# Patient Record
Sex: MALE | Race: WHITE | NOT HISPANIC OR LATINO | Employment: OTHER | URBAN - METROPOLITAN AREA
[De-identification: names, ages, dates, MRNs, and addresses within clinical notes are randomized per-mention and may not be internally consistent; named-entity substitution may affect disease eponyms.]

---

## 2017-01-02 ENCOUNTER — OFFICE VISIT (OUTPATIENT)
Dept: OPHTHALMOLOGY | Facility: CLINIC | Age: 77
End: 2017-01-02
Payer: COMMERCIAL

## 2017-01-02 DIAGNOSIS — H43.813 POSTERIOR VITREOUS DETACHMENT, BILATERAL: ICD-10-CM

## 2017-01-02 DIAGNOSIS — H26.9 CATARACT: ICD-10-CM

## 2017-01-02 DIAGNOSIS — E11.9 TYPE 2 DIABETES MELLITUS WITHOUT COMPLICATION, WITHOUT LONG-TERM CURRENT USE OF INSULIN (H): ICD-10-CM

## 2017-01-02 DIAGNOSIS — H52.4 PRESBYOPIA: ICD-10-CM

## 2017-01-02 DIAGNOSIS — H35.30 MACULAR DEGENERATION: ICD-10-CM

## 2017-01-02 DIAGNOSIS — Z01.01 ENCOUNTER FOR EXAMINATION OF EYES AND VISION WITH ABNORMAL FINDINGS: Primary | ICD-10-CM

## 2017-01-02 DIAGNOSIS — Z96.1 PSEUDOPHAKIA: ICD-10-CM

## 2017-01-02 PROCEDURE — 92015 DETERMINE REFRACTIVE STATE: CPT | Performed by: OPHTHALMOLOGY

## 2017-01-02 PROCEDURE — 92014 COMPRE OPH EXAM EST PT 1/>: CPT | Performed by: OPHTHALMOLOGY

## 2017-01-02 ASSESSMENT — VISUAL ACUITY
OD_CC: 20/50
OS_PH_CC: 25+2
OS_CC: 20/30
OD_CC: 10
CORRECTION_TYPE: GLASSES
OS_CC: 8
OS_CC+: -2
METHOD: SNELLEN - LINEAR
OD_PH_CC: 30-1

## 2017-01-02 ASSESSMENT — REFRACTION_WEARINGRX
SPECS_TYPE: TRIFOCAL
OS_ADD: +3.00
OS_SPHERE: -1.50
OD_SPHERE: -1.75
OS_AXIS: 176
OD_CYLINDER: +3.00
OD_AXIS: 015
OD_ADD: +3.00
OS_CYLINDER: +0.75

## 2017-01-02 ASSESSMENT — REFRACTION_MANIFEST
OD_AXIS: 017
OD_CYLINDER: +3.75
OS_CYLINDER: +0.50
OD_SPHERE: -2.00
OS_ADD: +3.00
OS_AXIS: 168
OD_ADD: +3.00
OS_SPHERE: -1.00

## 2017-01-02 ASSESSMENT — TONOMETRY
OS_IOP_MMHG: 13
IOP_METHOD: APPLANATION
OD_IOP_MMHG: 20

## 2017-01-02 ASSESSMENT — EXTERNAL EXAM - LEFT EYE: OS_EXAM: PROLAPSED FAT PADS: UPPER, LOWER

## 2017-01-02 ASSESSMENT — CUP TO DISC RATIO
OD_RATIO: 0.3
OS_RATIO: 0.3

## 2017-01-02 ASSESSMENT — CONF VISUAL FIELD
OS_NORMAL: 1
OD_NORMAL: 1

## 2017-01-02 ASSESSMENT — EXTERNAL EXAM - RIGHT EYE: OD_EXAM: PROLAPSED FAT PADS: UPPER, LOWER

## 2017-01-02 ASSESSMENT — SLIT LAMP EXAM - LIDS
COMMENTS: 2+ DERMATOCHALASIS - UPPER LID
COMMENTS: 2+ DERMATOCHALASIS - UPPER LID

## 2017-01-02 NOTE — MR AVS SNAPSHOT
"              After Visit Summary   1/2/2017    Elijah Pavon    MRN: [id number]           Patient Information     Date Of Birth          1940        Visit Information        Provider Department      1/2/2017 10:00 AM Kodak Hurley MD [facility]        Today's Diagnoses     Encounter for examination of eyes and vision with abnormal findings    -  1     Presbyopia         Myopia, bilateral         Astigmatism, bilateral         Type 2 diabetes mellitus without complication, without long-term current use of insulin (H)         Cataract, mild-mod, od         Macular degeneration (senile) of retina         Pseudophakia, os         Posterior vitreous detachment, bilateral           Care Instructions    Possible clouding of posterior capsule discussed. Left eye   Take a multiple vitamin or \"eye vitamin\" daily.  Protect your eyes outdoors from ultraviolet rays with sunglasses and/or brimmed hat.  Have spinach (cooked or raw), colorful fruits, walnuts, hazelnuts, almonds in your diet.  Monitor the vision in each eye weekly - call if any sudden persistent changes.   Offered cataract surgery right eye at anytime. Call Sandy VALDEZ @ [phone] to schedule.   Continue same glasses.   Otherwise Call in September 2017 for an appointment in January 2018 for Complete Exam    Dr. Hurley [phone]        Follow-ups after your visit        Who to contact     If you have questions or need follow up information about today's clinic visit or your schedule please contact [facility] directly at [phone].  Normal or non-critical lab and imaging results will be communicated to you by MyChart, letter or phone within 4 business days after the clinic has received the results. If you do not hear from us within 7 days, please contact the clinic through MyChart or phone. If you have a critical or abnormal lab result, we will notify you by phone as soon as possible.  Submit refill requests through " "[employer] or call your pharmacy and they will forward the refill request to us. Please allow 3 business days for your refill to be completed.          Additional Information About Your Visit        MyChart Information     [employer] lets you send messages to your doctor, view your test results, renew your prescriptions, schedule appointments and more. To sign up, go to [email or web address] . Click on \"Log in\" on the left side of the screen, which will take you to the Welcome page. Then click on \"Sign up Now\" on the right side of the page.     You will be asked to enter the access code listed below, as well as some personal information. Please follow the directions to create your username and password.     Your access code is: [email or web address]  Expires: 2017 10:47 AM     Your access code will  in 90 days. If you need help or a new code, please call your [place] clinic or [phone].         Blood Pressure from Last 3 Encounters:   No data found for BP    Weight from Last 3 Encounters:   No data found for Wt              We Performed the Following     EYE EXAM (SIMPLE-NONBILLABLE)     REFRACTIVE STATUS        Primary Care Provider Office Phone # Fax #    [place] [phone]       [facility] [address] [place] [address]        Thank you!     Thank you for choosing [facility]] [place]  for your care. Our goal is always to provide you with excellent care. Hearing back from our patients is one way we can continue to improve our services. Please take a few minutes to complete the written survey that you may receive in the mail after your visit with us. Thank you!             Your Updated Medication List - Protect others around you: Learn how to safely use, store and throw away your medicines at www.disposemymeds.org.          This list is accurate as of: 17 10:47 AM.  Always use your most recent med list.                   Brand Name Dispense Instructions " for use    ASPIR-81 PO      Take  by mouth.       chlorthalidone 25 MG tablet    HYGROTON     Take 25 mg by mouth daily.       DIOVAN 320 MG tablet   Generic drug:  valsartan      Take 1 tablet by mouth daily.       metFORMIN 500 MG tablet    GLUCOPHAGE     Take 500 mg by mouth 2 times daily (with meals).       MULTI-VITAMIN DAILY PO      Take 1 tablet by mouth once       simvastatin 20 MG tablet    ZOCOR     Take  by mouth At Bedtime.

## 2017-01-02 NOTE — PROGRESS NOTES
" Current Eye Medications:  none     Subjective:  Comprehensive Eye Exam.  Patient is here for a Diabetic Eye Exam.  Last A1C was:  6.5 about 4 months ago.  He is having difficulty seeing small print (crosswords).  Distance vision appears to be about the same.      Objective:  See Ophthalmology Exam.       Assessment: Cataract now visually significant right eye.  No diabetic retinopathy.      ICD-10-CM    1. Encounter for examination of eyes and vision with abnormal findings Z01.01 REFRACTIVE STATUS   2. Presbyopia H52.4 REFRACTIVE STATUS   3. Type 2 diabetes mellitus without complication, without long-term current use of insulin (H) E11.9 EYE EXAM (SIMPLE-NONBILLABLE)   4. Cataract, mod, od H26.9    5. Pseudophakia, os Z96.1    6. Macular degeneration, dry, mild, ou H35.30    7. Posterior vitreous detachment, bilateral H43.813         Plan: Possible clouding of posterior capsule discussed. Left eye   Take a multiple vitamin or \"eye vitamin\" daily.  Protect your eyes outdoors from ultraviolet rays with sunglasses and/or brimmed hat.  Have spinach (cooked or raw), colorful fruits, walnuts, hazelnuts, almonds in your diet.  Monitor the vision in each eye weekly - call if any sudden persistent changes.   Offered cataract surgery right eye at anytime. Call Sandy VALDEZ @ [phone] to schedule.   Continue same glasses.   Otherwise Call in September 2017 for an appointment in January 2018 for Complete Exam    Dr. Hurley [phone]           "

## 2017-01-02 NOTE — PATIENT INSTRUCTIONS
"Possible clouding of posterior capsule discussed. Left eye   Take a multiple vitamin or \"eye vitamin\" daily.  Protect your eyes outdoors from ultraviolet rays with sunglasses and/or brimmed hat.  Have spinach (cooked or raw), colorful fruits, walnuts, hazelnuts, almonds in your diet.  Monitor the vision in each eye weekly - call if any sudden persistent changes.   Offered cataract surgery right eye at anytime. Call Sandy VALDEZ @ [phone] to schedule.   Continue same glasses.   Otherwise Call in September 2017 for an appointment in January 2018 for Complete Exam    Dr. Hurley [phone]  "

## 2017-01-20 ENCOUNTER — TELEPHONE (OUTPATIENT)
Dept: OPHTHALMOLOGY | Facility: CLINIC | Age: 77
End: 2017-01-20

## 2017-01-20 NOTE — TELEPHONE ENCOUNTER
Patient had seen Dr. Hurley last 1/2/17 and cataract surgery was discussed. The patient would now like to schedule cataract surgery, he didn't state which eye.    Please ask Sandy to call the patient at home to schedule surgery. Thank you.

## 2017-01-23 NOTE — TELEPHONE ENCOUNTER
Patient called 1/20/17 at 4:36 p.m. He would like to speak with Sandy to schedule cataract surgery with .     Please call the patient at home. Thank you.

## 2017-03-07 ENCOUNTER — OFFICE VISIT (OUTPATIENT)
Dept: OPHTHALMOLOGY | Facility: CLINIC | Age: 77
End: 2017-03-07
Payer: COMMERCIAL

## 2017-03-07 DIAGNOSIS — H26.9 CATARACT: Primary | ICD-10-CM

## 2017-03-07 PROCEDURE — 92136 OPHTHALMIC BIOMETRY: CPT | Mod: 26 | Performed by: OPHTHALMOLOGY

## 2017-03-07 PROCEDURE — 92012 INTRM OPH EXAM EST PATIENT: CPT | Performed by: OPHTHALMOLOGY

## 2017-03-07 RX ORDER — PREDNISOLONE ACETATE 10 MG/ML
1 SUSPENSION/ DROPS OPHTHALMIC 3 TIMES DAILY
Qty: 5 ML | Refills: 0 | Status: SHIPPED | OUTPATIENT
Start: 2017-03-17 | End: 2017-07-13

## 2017-03-07 RX ORDER — OFLOXACIN 3 MG/ML
1 SOLUTION/ DROPS OPHTHALMIC 3 TIMES DAILY
Qty: 1 BOTTLE | Refills: 0 | Status: SHIPPED | OUTPATIENT
Start: 2017-03-15 | End: 2017-07-13

## 2017-03-07 RX ORDER — DICLOFENAC SODIUM 1 MG/ML
1 SOLUTION/ DROPS OPHTHALMIC 3 TIMES DAILY
Qty: 5 ML | Refills: 0 | Status: SHIPPED | OUTPATIENT
Start: 2017-03-17 | End: 2017-07-13

## 2017-03-07 ASSESSMENT — EXTERNAL EXAM - RIGHT EYE: OD_EXAM: PROLAPSED FAT PADS: UPPER, LOWER

## 2017-03-07 ASSESSMENT — SLIT LAMP EXAM - LIDS
COMMENTS: 2+ DERMATOCHALASIS - UPPER LID
COMMENTS: 2+ DERMATOCHALASIS - UPPER LID

## 2017-03-07 ASSESSMENT — VISUAL ACUITY
OD_CC: 20/50-1
METHOD: SNELLEN - LINEAR

## 2017-03-07 ASSESSMENT — EXTERNAL EXAM - LEFT EYE: OS_EXAM: PROLAPSED FAT PADS: UPPER, LOWER

## 2017-03-07 NOTE — MR AVS SNAPSHOT
After Visit Summary   3/7/2017    Elijah Pavon    MRN: [id number]           Patient Information     Date Of Birth          1940        Visit Information        Provider Department      3/7/2017 9:15 AM Kodak Hurley MD [facility]        Today's Diagnoses     Cataract, mod, od    -  1      Care Instructions    PRE-OP CATARACT INSTRUCTIONS    *You will be picking up 3 eye drop bottles from your pharmacy.    *Use the following drops in the right eye  3 times a day the day before surgery and once the morning of surgery.                                   Ocuflox (tan cap)    *If taking more than one drop, wait five minutes between drops.    *No solid food after midnight.    *Clear liquids: coffee (no cream), tea, water, and jello are OK before 6:30 A.M.  You may take your regular pills with this.    *If you are taking glaucoma drops, continue as usual.    *No diabetic medications the morning of surgery.    Kodak Hurley M.D.            Follow-ups after your visit        Your next 10 appointments already scheduled     Mar 17, 2017  9:15 AM CDT   Return Visit with Kodak Hurley MD   [facility] ([facility])    [address] [place] [id number] [phone]            Mar 23, 2017  9:15 AM CDT   Return Visit with Kodak Hurley MD   [facility] ([facility])    [address] [place] [address] [phone]            Apr 24, 2017  9:15 AM CDT   Return Visit with Kodak Hurley MD   [facility] ([address] [address]   [phone]              Who to contact     If you have questions or need follow up information about today's clinic visit or your schedule please contact [facility] directly at [phone].  Normal or non-critical lab and imaging results will be communicated to you by MyChart, letter or phone within 4  "business days after the clinic has received the results. If you do not hear from us within 7 days, please contact the clinic through [employer] or phone. If you have a critical or abnormal lab result, we will notify you by phone as soon as possible.  Submit refill requests through [employer] or call your pharmacy and they will forward the refill request to us. Please allow 3 business days for your refill to be completed.          Additional Information About Your Visit        Elonics Information     [employer] lets you send messages to your doctor, view your test results, renew your prescriptions, schedule appointments and more. To sign up, go to [email or web address] . Click on \"Log in\" on the left side of the screen, which will take you to the Welcome page. Then click on \"Sign up Now\" on the right side of the page.     You will be asked to enter the access code listed below, as well as some personal information. Please follow the directions to create your username and password.     Your access code is: [email or web address]  Expires: 2017 10:47 AM     Your access code will  in 90 days. If you need help or a new code, please call your [facility] clinic or [phone].        Care EveryWhere ID     This is your Care EveryWhere ID. This could be used by other organizations to access your [facility] medical records  [id number]         Blood Pressure from Last 3 Encounters:   No data found for BP    Weight from Last 3 Encounters:   No data found for Wt              We Performed the Following     IOL MASTER (2nd eye)          Today's Medication Changes          These changes are accurate as of: 3/7/17 10:20 AM.  If you have any questions, ask your nurse or doctor.               Start taking these medicines.        Dose/Directions    diclofenac 0.1 % ophthalmic solution   Commonly known as:  VOLTAREN   Used for:  Cataract        Dose:  1 drop   Start taking on:  3/17/2017   Place 1 drop into the right eye 3 times daily "   Quantity:  5 mL   Refills:  0       ofloxacin 0.3 % ophthalmic solution   Commonly known as:  OCUFLOX   Used for:  Cataract        Dose:  1 drop   Start taking on:  3/15/2017   Place 1 drop into the right eye 3 times daily   Quantity:  1 Bottle   Refills:  0       prednisoLONE acetate 1 % ophthalmic susp   Commonly known as:  PRED FORTE   Used for:  Cataract        Dose:  1 drop   Start taking on:  3/17/2017   Place 1 drop into the right eye 3 times daily   Quantity:  5 mL   Refills:  0            Where to get your medicines      These medications were sent to [facility] [id number] [place] [address] [address] [place] [address] AT [address] [facility] [place] [facility]  [address] [facility] [address]     Phone:  [phone]     diclofenac 0.1 % ophthalmic solution    ofloxacin 0.3 % ophthalmic solution    prednisoLONE acetate 1 % ophthalmic susp                Primary Care Provider Office Phone # Fax #    [place] [phone]       [facility] [address] [place] [address]        Thank you!     Thank you for choosing [facility]  for your care. Our goal is always to provide you with excellent care. Hearing back from our patients is one way we can continue to improve our services. Please take a few minutes to complete the written survey that you may receive in the mail after your visit with us. Thank you!             Your Updated Medication List - Protect others around you: Learn how to safely use, store and throw away your medicines at www.disposemymeds.org.          This list is accurate as of: 3/7/17 10:20 AM.  Always use your most recent med list.                   Brand Name Dispense Instructions for use    ASPIR-81 PO      Take  by mouth.       chlorthalidone 25 MG tablet    HYGROTON     Take 25 mg by mouth daily.       diclofenac 0.1 % ophthalmic solution   Start taking on:  3/17/2017    VOLTAREN    5 mL    Place 1 drop into the right eye 3  times daily       DIOVAN 320 MG tablet   Generic drug:  valsartan      Take 1 tablet by mouth daily.       metFORMIN 500 MG tablet    GLUCOPHAGE     Take 500 mg by mouth 2 times daily (with meals).       MULTI-VITAMIN DAILY PO      Take 1 tablet by mouth once       ofloxacin 0.3 % ophthalmic solution   Start taking on:  3/15/2017    OCUFLOX    1 Bottle    Place 1 drop into the right eye 3 times daily       prednisoLONE acetate 1 % ophthalmic susp   Start taking on:  3/17/2017    PRED FORTE    5 mL    Place 1 drop into the right eye 3 times daily       simvastatin 20 MG tablet    ZOCOR     Take  by mouth At Bedtime.

## 2017-03-07 NOTE — PATIENT INSTRUCTIONS
PRE-OP CATARACT INSTRUCTIONS    *You will be picking up 3 eye drop bottles from your pharmacy.    *Use the following drops in the right eye  3 times a day the day before surgery and once the morning of surgery.                                   Ocuflox (tan cap)    *If taking more than one drop, wait five minutes between drops.    *No solid food after midnight.    *Clear liquids: coffee (no cream), tea, water, and jello are OK before 6:30 A.M.  You may take your regular pills with this.    *If you are taking glaucoma drops, continue as usual.    *No diabetic medications the morning of surgery.    Kodak Hurley M.D.

## 2017-03-07 NOTE — PROGRESS NOTES
Current Eye Medications:  no     Subjective:  Pre op kpe right eye   Pt is scheduled for kpe right  on 03/16/2017.     Objective:  See Ophthalmology Exam.       Assessment: Visually significant cataract right eye.       Plan: Plan Kelman phacoemulsification/ posterior chamber lens right eye local standby.  Risks, benefits, complications, and alternatives discussed with patient including possibility of limitations from coexistent eye disease and loss of vision.  Target refraction and multifocal lens options discussed.  Patient understands and consents.  Kodak Hurley M.D.

## 2017-03-17 ENCOUNTER — OFFICE VISIT (OUTPATIENT)
Dept: OPHTHALMOLOGY | Facility: CLINIC | Age: 77
End: 2017-03-17
Payer: COMMERCIAL

## 2017-03-17 DIAGNOSIS — Z96.1 PSEUDOPHAKIA: Primary | ICD-10-CM

## 2017-03-17 PROCEDURE — 99024 POSTOP FOLLOW-UP VISIT: CPT | Performed by: OPHTHALMOLOGY

## 2017-03-17 ASSESSMENT — VISUAL ACUITY
OD_PH_SC: 20/25
METHOD: SNELLEN - LINEAR
OD_SC: 20/60
OD_SC+: -1

## 2017-03-17 ASSESSMENT — TONOMETRY: OD_IOP_MMHG: 26

## 2017-03-17 ASSESSMENT — SLIT LAMP EXAM - LIDS: COMMENTS: NORMAL

## 2017-03-17 NOTE — PATIENT INSTRUCTIONS
POST-OP CATARACT INSTRUCTIONS    Use the following drops in the right eye:    Oculflox (tan cap) 3 times a day   Prednisolone (pink or white cap) 3 times a day  Diclofenac (gray cap) 3 times a day    ~Wait at least 5 minutes between drops.    ~Wear eye shield at night for one week.    ~Do not exert yourself to the point that you are red in the face for one week.    ~If your vision worsens, eye becomes increasingly red, or becomes painful, call [phone].    ~If you are taking glaucoma medications, continue as usual.    ~OK to resume aspirin and/or other blood thinners.    Kodak Hurley M.D.

## 2017-03-17 NOTE — PROGRESS NOTES
Current Eye Medications:  Ocuflox, Diclofenac, Pred tid both eyes.     Subjective:  PO1 Kelman Phacoemulsification Intraocular lens right eye  No pain or discomfort noted.       Objective:  See Ophthalmology Exam.       Assessment: Doing well status/post Kelman phacoemulsification/ posterior chamber lens right eye.      Plan:   See Patient Instructions.

## 2017-03-17 NOTE — MR AVS SNAPSHOT
After Visit Summary   3/17/2017    Elijah Pavon    MRN: [id number]           Patient Information     Date Of Birth          1940        Visit Information        Provider Department      3/17/2017 9:15 AM Kodak Hurley MD [facility]        Today's Diagnoses     Pseudophakia, ou    -  1      Care Instructions    POST-OP CATARACT INSTRUCTIONS    Use the following drops in the right eye:    Oculflox (tan cap) 3 times a day   Prednisolone (pink or white cap) 3 times a day  Diclofenac (gray cap) 3 times a day    ~Wait at least 5 minutes between drops.    ~Wear eye shield at night for one week.    ~Do not exert yourself to the point that you are red in the face for one week.    ~If your vision worsens, eye becomes increasingly red, or becomes painful, call [phone].    ~If you are taking glaucoma medications, continue as usual.    ~OK to resume aspirin and/or other blood thinners.    Kodak Hurley M.D.            Follow-ups after your visit        Your next 10 appointments already scheduled     Mar 23, 2017  9:15 AM CDT   Return Visit with Kodak Hurley MD   [facility]    [address] [place] [id number] [phone]            Apr 24, 2017  9:15 AM CDT   Return Visit with Kodak Hurley MD   [facility] ([address] [address] [phone]              Who to contact     If you have questions or need follow up information about today's clinic visit or your schedule please contact [facility] directly at [phone].  Normal or non-critical lab and imaging results will be communicated to you by MyChart, letter or phone within 4 business days after the clinic has received the results. If you do not hear from us within 7 days, please contact the clinic through MyChart or phone. If you have a critical or abnormal lab result, we will notify you  "by phone as soon as possible.  Submit refill requests through [employer] or call your pharmacy and they will forward the refill request to us. Please allow 3 business days for your refill to be completed.          Additional Information About Your Visit        [employer]atu Information     [employer] lets you send messages to your doctor, view your test results, renew your prescriptions, schedule appointments and more. To sign up, go to [email or web address] . Click on \"Log in\" on the left side of the screen, which will take you to the Welcome page. Then click on \"Sign up Now\" on the right side of the page.     You will be asked to enter the access code listed below, as well as some personal information. Please follow the directions to create your username and password.     Your access code is: [email or web address]  Expires: 2017 11:47 AM     Your access code will  in 90 days. If you need help or a new code, please call your [facility] clinic or [phone].        Care EveryWhere ID     This is your Care EveryWhere ID. This could be used by other organizations to access your [facility] medical records  [id number]         Blood Pressure from Last 3 Encounters:   No data found for BP    Weight from Last 3 Encounters:   No data found for Wt              Today, you had the following     No orders found for display       Primary Care Provider Office Phone # Fax #    [place] [phone]       [facility] [address] [place] [address]        Thank you!     Thank you for choosing [facility] [place]  for your care. Our goal is always to provide you with excellent care. Hearing back from our patients is one way we can continue to improve our services. Please take a few minutes to complete the written survey that you may receive in the mail after your visit with us. Thank you!             Your Updated Medication List - Protect others around you: Learn how to safely use, store and " throw away your medicines at www.disposemymeds.org.          This list is accurate as of: 3/17/17 10:04 AM.  Always use your most recent med list.                   Brand Name Dispense Instructions for use    ASPIR-81 PO      Take  by mouth.       chlorthalidone 25 MG tablet    HYGROTON     Take 25 mg by mouth daily.       diclofenac 0.1 % ophthalmic solution    VOLTAREN    5 mL    Place 1 drop into the right eye 3 times daily       DIOVAN 320 MG tablet   Generic drug:  valsartan      Take 1 tablet by mouth daily.       metFORMIN 500 MG tablet    GLUCOPHAGE     Take 500 mg by mouth 2 times daily (with meals).       MULTI-VITAMIN DAILY PO      Take 1 tablet by mouth once       ofloxacin 0.3 % ophthalmic solution    OCUFLOX    1 Bottle    Place 1 drop into the right eye 3 times daily       prednisoLONE acetate 1 % ophthalmic susp    PRED FORTE    5 mL    Place 1 drop into the right eye 3 times daily       simvastatin 20 MG tablet    ZOCOR     Take  by mouth At Bedtime.

## 2017-03-23 ENCOUNTER — OFFICE VISIT (OUTPATIENT)
Dept: OPHTHALMOLOGY | Facility: CLINIC | Age: 77
End: 2017-03-23
Payer: COMMERCIAL

## 2017-03-23 DIAGNOSIS — Z96.1 PSEUDOPHAKIA: Primary | ICD-10-CM

## 2017-03-23 PROCEDURE — 99024 POSTOP FOLLOW-UP VISIT: CPT | Performed by: OPHTHALMOLOGY

## 2017-03-23 ASSESSMENT — TONOMETRY
IOP_METHOD: APPLANATION
OD_IOP_MMHG: 18

## 2017-03-23 ASSESSMENT — REFRACTION_MANIFEST
OD_SPHERE: -1.25
OD_CYLINDER: +2.50
OD_AXIS: 016

## 2017-03-23 ASSESSMENT — VISUAL ACUITY
METHOD: SNELLEN - LINEAR
OD_SC: 20/60
OD_SC+: -2

## 2017-03-23 ASSESSMENT — SLIT LAMP EXAM - LIDS: COMMENTS: NORMAL

## 2017-03-23 NOTE — MR AVS SNAPSHOT
"              After Visit Summary   3/23/2017    Elijah Pavon    MRN: [id number]           Patient Information     Date Of Birth          1940        Visit Information        Provider Department      3/23/2017 9:15 AM Kodak Hurley MD [facility]        Care Instructions    1) Continue all drops three times daily until gone.    2)  Stop using shield after one week one week after surgery.    3)  Return for final exam as scheduled in about one month.    Kodak Hurley M.D.        Follow-ups after your visit        Your next 10 appointments already scheduled     Apr 24, 2017  9:15 AM CDT   Return Visit with Kodak Hurley MD   [facility] ([address] [address] [phone]              Who to contact     If you have questions or need follow up information about today's clinic visit or your schedule please contact [facility] directly at [phone].  Normal or non-critical lab and imaging results will be communicated to you by Collider Mediahart, letter or phone within 4 business days after the clinic has received the results. If you do not hear from us within 7 days, please contact the clinic through Nduo.cnt or phone. If you have a critical or abnormal lab result, we will notify you by phone as soon as possible.  Submit refill requests through Trend[employer] Entertainment or call your pharmacy and they will forward the refill request to us. Please allow 3 business days for your refill to be completed.          Additional Information About Your Visit        Collider MediaharNetlogon Information     [employer] lets you send messages to your doctor, view your test results, renew your prescriptions, schedule appointments and more. To sign up, go to [email or web address] . Click on \"Log in\" on the left side of the screen, which will take you to the Welcome page. Then click on \"Sign up Now\" on the right side of the page.     You will be asked to enter the access " code listed below, as well as some personal information. Please follow the directions to create your username and password.     Your access code is: [email or web address]  Expires: 2017 11:47 AM     Your access code will  in 90 days. If you need help or a new code, please call your [place] clinic or [phone].        Care EveryWhere ID     This is your Care EveryWhere ID. This could be used by other organizations to access your [place] medical records  [id number]         Blood Pressure from Last 3 Encounters:   No data found for BP    Weight from Last 3 Encounters:   No data found for Wt              Today, you had the following     No orders found for display       Primary Care Provider Office Phone # Fax #    [place] [phone]       [facility] [address] [place] [address]        Thank you!     [facility] you for [facility]  [facility] your [facility]. Our goal is always to provide you with excellent care. Hearing back from our patients is one way we can continue to improve our services. Please take a few minutes to complete the written survey that you may receive in the mail after your visit with us. Thank you!             Your Updated Medication List - Protect others around you: Learn how to safely use, store and throw away your medicines at www.disposemymeds.org.          This list is accurate as of: 3/23/17  9:41 AM.  Always use your most recent med list.                   Brand Name Dispense Instructions for use    ASPIR-81 PO      Take  by mouth.       chlorthalidone 25 MG tablet    HYGROTON     Take 25 mg by mouth daily.       diclofenac 0.1 % ophthalmic solution    VOLTAREN    5 mL    Place 1 drop into the right eye 3 times daily       DIOVAN 320 MG tablet   Generic drug:  valsartan      Take 1 tablet by mouth daily.       metFORMIN 500 MG tablet    GLUCOPHAGE     Take 500 mg by mouth 2 times daily (with meals).       MULTI-VITAMIN DAILY PO       Take 1 tablet by mouth once       ofloxacin 0.3 % ophthalmic solution    OCUFLOX    1 Bottle    Place 1 drop into the right eye 3 times daily       prednisoLONE acetate 1 % ophthalmic susp    PRED FORTE    5 mL    Place 1 drop into the right eye 3 times daily       simvastatin 20 MG tablet    ZOCOR     Take  by mouth At Bedtime.

## 2017-03-23 NOTE — PROGRESS NOTES
" Current Eye Medications:  Ocuflox tid right eye, Prednisolone 1% tid right eye, diclofenac tid right eye.     Subjective:  S/P Kelman Phacoemulsification Intraocular lens right eye PO2.  Eye is feeling good.  Vision is better\"brighter\"  Patient states that he has a full bottle of the Ocuflox left (received 2 bottles at start)     Objective:  See Ophthalmology Exam.       Assessment:  Doing well status/post Kelman phacoemulsification/ posterior chamber lens right eye.      Plan:   See Patient Instructions.       "

## 2017-03-23 NOTE — PATIENT INSTRUCTIONS
1) Continue all drops three times daily until gone.    2)  Stop using shield after one week one week after surgery.    3)  Return for final exam as scheduled in about one month.    Kodak Hurley M.D.

## 2017-04-24 ENCOUNTER — OFFICE VISIT (OUTPATIENT)
Dept: OPHTHALMOLOGY | Facility: CLINIC | Age: 77
End: 2017-04-24
Payer: COMMERCIAL

## 2017-04-24 DIAGNOSIS — Z96.1 PSEUDOPHAKIA: Primary | ICD-10-CM

## 2017-04-24 PROCEDURE — 99024 POSTOP FOLLOW-UP VISIT: CPT | Performed by: OPHTHALMOLOGY

## 2017-04-24 ASSESSMENT — REFRACTION_MANIFEST
OD_ADD: +3.00
OS_SPHERE: -2.00
OD_AXIS: 011
OS_AXIS: 178
OD_CYLINDER: +2.25
OS_CYLINDER: +2.50
OS_ADD: +3.00
OD_SPHERE: -1.50

## 2017-04-24 ASSESSMENT — REFRACTION_WEARINGRX
OD_AXIS: 013
OS_AXIS: 173
OD_SPHERE: -1.75
SPECS_TYPE: TRIFOCAL
OS_ADD: +3.00
OD_CYLINDER: +3.00
OS_SPHERE: -1.50
OS_CYLINDER: +0.50
OD_ADD: +3.00

## 2017-04-24 ASSESSMENT — VISUAL ACUITY
METHOD: SNELLEN - LINEAR
OS_CC: 20/20
OS_CC+: -1
OD_CC: 20/20

## 2017-04-24 ASSESSMENT — SLIT LAMP EXAM - LIDS
COMMENTS: NORMAL
COMMENTS: 2+ DERMATOCHALASIS - UPPER LID

## 2017-04-24 ASSESSMENT — EXTERNAL EXAM - RIGHT EYE: OD_EXAM: PROLAPSED FAT PADS: UPPER, LOWER

## 2017-04-24 ASSESSMENT — EXTERNAL EXAM - LEFT EYE: OS_EXAM: PROLAPSED FAT PADS: UPPER, LOWER

## 2017-04-24 ASSESSMENT — CUP TO DISC RATIO: OD_RATIO: 0.3

## 2017-04-24 ASSESSMENT — TONOMETRY
IOP_METHOD: APPLANATION
OD_IOP_MMHG: 12

## 2017-04-24 NOTE — PROGRESS NOTES
Current Eye Medications:  Pred, diclofenac, tid right eye.  Patient finished drops about a week ago.     Subjective:  Final MR right eye.  Kelman Phacoemulsification Intraocular lens.  No complaints.  Vision is good.     Objective:  See Ophthalmology Exam.       Assessment:  Doing well status/post Kelman phacoemulsification/ posterior chamber lens right eye.      Plan:   See Patient Instructions.

## 2017-04-24 NOTE — MR AVS SNAPSHOT
"              After Visit Summary   4/24/2017    Elijah Pavon    MRN: [id number]           Patient Information     Date Of Birth          1940        Visit Information        Provider Department      4/24/2017 9:15 AM Kodak Hurley MD [facility]        Care Instructions    1)  Discontinue all drops, unless used prior to cataract surgery.    2)   Fill Rx for new glasses or drugstore readers.    3)   Return to clinic in three months for a pressure check or earlier if problems should arise.    4)   Try Zaditor eye drops both eyes twice daily as needed for allergy.     Kodak Hurley M.D.             Follow-ups after your visit        Who to contact     If you have questions or need follow up information about today's clinic visit or your schedule please contact [facility] directly at [phone].  Normal or non-critical lab and imaging results will be communicated to you by BioVexhart, letter or phone within 4 business days after the clinic has received the results. If you do not hear from us within 7 days, please contact the clinic through MyChart or phone. If you have a critical or abnormal lab result, we will notify you by phone as soon as possible.  Submit refill requests through LogRhythm or call your pharmacy and they will forward the refill request to us. Please allow 3 business days for your refill to be completed.          Additional Information About Your Visit        MyChart Information     LogRhythm lets you send messages to your doctor, view your test results, renew your prescriptions, schedule appointments and more. To sign up, go to [email or web address] . Click on \"Log in\" on the left side of the screen, which will take you to the Welcome page. Then click on \"Sign up Now\" on the right side of the page.     You will be asked to enter the access code listed below, as well as some personal information. Please follow the directions to create your username and " password.     Your access code is: [email or web address]  Expires: 2017 10:24 AM     Your access code will  in 90 days. If you need help or a new code, please call your [place] clinic or [phone].        Care EveryWhere ID     This is your Care EveryWhere ID. This could be used by other organizations to access your [place] medical records  [id number]         Blood Pressure from Last 3 Encounters:   No data found for BP    Weight from Last 3 Encounters:   No data found for Wt              Today, you had the following     No orders found for display       Primary Care Provider Office Phone # Fax #    [place] [phone]       [facility] [address] [place] [address]        Thank you!     Thank you for choosing [facility]  for your care. Our goal is always to provide you with excellent care. Hearing back from our patients is one way we can continue to improve our services. Please take a few minutes to complete the written survey that you may receive in the mail after your visit with us. Thank you!             Your Updated Medication List - Protect others around you: Learn how to safely use, store and throw away your medicines at www.disposemymeds.org.          This list is accurate as of: 17 10:24 AM.  Always use your most recent med list.                   Brand Name Dispense Instructions for use    ASPIR-81 PO      Take  by mouth.       chlorthalidone 25 MG tablet    HYGROTON     Take 25 mg by mouth daily.       diclofenac 0.1 % ophthalmic solution    VOLTAREN    5 mL    Place 1 drop into the right eye 3 times daily       DIOVAN 320 MG tablet   Generic drug:  valsartan      Take 1 tablet by mouth daily.       metFORMIN 500 MG tablet    GLUCOPHAGE     Take 500 mg by mouth 2 times daily (with meals).       MULTI-VITAMIN DAILY PO      Take 1 tablet by mouth once       ofloxacin 0.3 % ophthalmic solution    OCUFLOX    1 Bottle    Place 1 drop  into the right eye 3 times daily       prednisoLONE acetate 1 % ophthalmic susp    PRED FORTE    5 mL    Place 1 drop into the right eye 3 times daily       simvastatin 20 MG tablet    ZOCOR     Take  by mouth At Bedtime.

## 2017-04-24 NOTE — PATIENT INSTRUCTIONS
1)  Discontinue all drops, unless used prior to cataract surgery.    2)   Fill Rx for new glasses or drugstore readers.    3)   Return to clinic in three months for a pressure check or earlier if problems should arise.    4)   Try Zaditor eye drops both eyes twice daily as needed for allergy.     Kodak Hurley M.D.

## 2017-07-13 ENCOUNTER — OFFICE VISIT (OUTPATIENT)
Dept: OPHTHALMOLOGY | Facility: CLINIC | Age: 77
End: 2017-07-13
Payer: COMMERCIAL

## 2017-07-13 DIAGNOSIS — Z96.1 PSEUDOPHAKIA: Primary | ICD-10-CM

## 2017-07-13 PROCEDURE — 99213 OFFICE O/P EST LOW 20 MIN: CPT | Performed by: OPHTHALMOLOGY

## 2017-07-13 ASSESSMENT — VISUAL ACUITY
OD_CC: 20/25
OS_PH_CC: 20/25
CORRECTION_TYPE: GLASSES
OS_CC: 20/30
OD_CC+: -1+3
METHOD: SNELLEN - LINEAR
OS_CC+: -2

## 2017-07-13 ASSESSMENT — TONOMETRY
OD_IOP_MMHG: 12
IOP_METHOD: APPLANATION
OS_IOP_MMHG: 14

## 2017-07-13 NOTE — MR AVS SNAPSHOT
"              After Visit Summary   7/13/2017    Eljiah Pavon    MRN: [id number]           Patient Information     Date Of Birth          1940        Visit Information        Provider Department      7/13/2017 9:15 AM Kodak Hurley MD [facility]        Care Instructions    Possible clouding of posterior capsule discussed.  Benefit for lens change right eye good for one year from date of surgery.  Call in March 2018 for an appointment in July 2018 for Complete Exam    Dr. Hurley [phone]               Follow-ups after your visit        Who to contact     If you have questions or need follow up information about today's clinic visit or your schedule please contact [facility] directly at [phone].  Normal or non-critical lab and imaging results will be communicated to you by VisibleBrandshart, letter or phone within 4 business days after the clinic has received the results. If you do not hear from us within 7 days, please contact the clinic through VisibleBrandshart or phone. If you have a critical or abnormal lab result, we will notify you by phone as soon as possible.  Submit refill requests through MOWGLI or call your pharmacy and they will forward the refill request to us. Please allow 3 business days for your refill to be completed.          Additional Information About Your Visit        MyCharFPSI Information     MOWGLI lets you send messages to your doctor, view your test results, renew your prescriptions, schedule appointments and more. To sign up, go to [email or web address] . Click on \"Log in\" on the left side of the screen, which will take you to the Welcome page. Then click on \"Sign up Now\" on the right side of the page.     You will be asked to enter the access code listed below, as well as some personal information. Please follow the directions to create your username and password.     Your access code is: [email or web address]  Expires: 7/23/2017 10:24 AM     Your access code will "  in 90 days. If you need help or a new code, please call your [place] clinic or [phone].        Care EveryWhere ID     This is your Care EveryWhere ID. This could be used by other organizations to access your [place] medical records  [id number]         Blood Pressure from Last 3 Encounters:   No data found for BP    Weight from Last 3 Encounters:   No data found for Wt              Today, you had the following     No orders found for display       Primary Care Provider Office Phone # Fax #    [place] [phone]       [facility] [address] [place] [address]        Equal Access to Services     [facility]: Hadii aad ku hadasho Soomaali, waaxda luqadaha, qaybta kaalmada adeegyada, waxrodri babin haycaitie nayak . So [facility] [phone].    ATENCIÓN: Si habla español, tiene a cerrato disposición servicios gratuitos de asistencia lingüística. LlSelect [facility] [place] [phone].    We comply with applicable federal civil rights laws and [place] laws. We do not discriminate on the basis of race, color, national origin, age, disability sex, sexual orientation or gender identity.            Thank you!     Thank you for choosing [facility]  for your care. Our goal is always to provide you with excellent care. Hearing back from our patients is one way we can continue to improve our services. Please take a few minutes to complete the written survey that you may receive in the mail after your visit with us. Thank you!             Your Updated Medication List - Protect others around you: Learn how to safely use, store and throw away your medicines at www.disposemymeds.org.          This list is accurate as of: 17 10:10 AM.  Always use your most recent med list.                   Brand Name Dispense Instructions for use Diagnosis    ASPIR-81 PO      Take  by mouth.        chlorthalidone 25 MG tablet    HYGROTON     Take 25 mg by mouth daily.         DIOVAN 320 MG tablet   Generic drug:  valsartan      Take 1 tablet by mouth daily.        metFORMIN 500 MG tablet    GLUCOPHAGE     Take 500 mg by mouth 2 times daily (with meals).        MULTI-VITAMIN DAILY PO      Take 1 tablet by mouth once        simvastatin 20 MG tablet    ZOCOR     Take  by mouth At Bedtime.

## 2017-07-13 NOTE — PROGRESS NOTES
Current Eye Medications: none     Subjective:  Here for TA today, s/p cataract surgery. Doing well, no problems or pain     Objective:  See Ophthalmology Exam.       Assessment:  Doing well status/post Kelman phacoemulsification/ posterior chamber lens right eye.      Plan: Possible clouding of posterior capsule discussed.  Benefit for lens change right eye good for one year from date of surgery.  Call in March 2018 for an appointment in July 2018 for Complete Exam    Dr. Hurley [phone]

## 2017-07-13 NOTE — PATIENT INSTRUCTIONS
Possible clouding of posterior capsule discussed.  Benefit for lens change right eye good for one year from date of surgery.  Call in March 2018 for an appointment in July 2018 for Complete Exam    Dr. Hurley [phone]

## 2018-05-20 ENCOUNTER — OFFICE VISIT (OUTPATIENT)
Dept: URGENT CARE | Facility: URGENT CARE | Age: 78
End: 2018-05-20
Payer: COMMERCIAL

## 2018-05-20 VITALS
OXYGEN SATURATION: 96 % | SYSTOLIC BLOOD PRESSURE: 145 MMHG | DIASTOLIC BLOOD PRESSURE: 78 MMHG | HEART RATE: 98 BPM | TEMPERATURE: 97.4 F
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
## 2018-05-20 DIAGNOSIS — S00.03XA HEMATOMA OF SCALP, INITIAL ENCOUNTER: Primary | ICD-10-CM

## 2018-05-20 PROCEDURE — 99203 OFFICE O/P NEW LOW 30 MIN: CPT | Performed by: PHYSICIAN ASSISTANT

## 2018-05-20 NOTE — MR AVS SNAPSHOT
"              After Visit Summary   5/20/2018    Elijah Pavon    MRN: [id number]           Patient Information     Date Of Birth          1940        Visit Information        Provider Department      5/20/2018 4:45 PM Zandra Doss PA-C [facility]        Today's Diagnoses     Hematoma of scalp, initial encounter    -  1    Fall, initial encounter           Follow-ups after your visit        Your next 10 appointments already scheduled     Jul 12, 2018 10:00 AM CDT   New Visit with Kodak Hurley MD   [facility] [address] [phone]              Who to contact     If you have questions or need follow up information about today's clinic visit or your schedule please contact [facility] directly at [phone].  Normal or non-critical lab and imaging results will be communicated to you by MyChart, letter or phone within 4 business days after the clinic has received the results. If you do not hear from us within 7 days, please contact the clinic through MyChart or phone. If you have a critical or abnormal lab result, we will notify you by phone as soon as possible.  Submit refill requests through Light[employer] or call your pharmacy and they will forward the refill request to us. Please allow 3 business days for your refill to be completed.          Additional Information About Your Visit        MyChart Information     Lightwave Power lets you send messages to your doctor, view your test results, renew your prescriptions, schedule appointments and more. To sign up, go to [email or web address] . Click on \"Log in\" on the left side of the screen, which will take you to the Welcome page. Then click on \"Sign up Now\" on the right side of the page.     You will be asked to enter the access code listed below, as well as some personal information. Please follow the directions to create your username and password.   "   Your access code is: [email or web address]  Expires: 2018  5:54 PM     Your access code will  in 90 days. If you need help or a new code, please call your [place] clinic or [phone].        Care EveryWhere ID     This is your Care EveryWhere ID. This could be used by other organizations to access your [place] medical records  [id number]        Your Vitals Were     Pulse Temperature Pulse Oximetry             98 97.4  F (36.3  C) (Oral) 96%          Blood Pressure from Last 3 Encounters:   18 145/78    Weight from Last 3 Encounters:   No data found for Wt              Today, you had the following     No orders found for display       Primary Care Provider Office Phone # Fax #    [place] [phone]       [facility] [address] [place] [address]        Equal Access to Services     [facility]: Hadii aad ku hadasho Soomaali, waaxda luqadaha, qaybta kaalmada adeegyada, waxay idiin hayaan adeeg kharamendoza camiloaan . So [facility] [phone].    ATENCIÓN: Si habla español, tiene a cerrato disposición servicios gratuitos de asistencia lingüística. Llame al [phone].    We comply with applicable federal civil rights laws and [place] laws. We do not discriminate on the basis of race, color, national origin, age, disability, sex, sexual orientation, or gender identity.            Thank you!     Thank you for [facility]  [facility]. Our goal is always to provide you with excellent care. Hearing back from our patients is one way we can continue to improve our services. Please take a few minutes to complete the written survey that you may receive in the mail after your visit with us. Thank you!             Your Updated Medication List - Protect others around you: Learn how to safely use, store and throw away your medicines at www.disposemymeds.org.          This list is accurate as of 18  5:54 PM.  Always use your most recent med list.                    Brand Name Dispense Instructions for use Diagnosis    ASPIR-81 PO      Take  by mouth.        chlorthalidone 25 MG tablet    HYGROTON     Take 25 mg by mouth daily.        DIOVAN 320 MG tablet   Generic drug:  valsartan      Take 1 tablet by mouth daily.        metFORMIN 500 MG tablet    GLUCOPHAGE     Take 500 mg by mouth 2 times daily (with meals).        MULTI-VITAMIN DAILY PO      Take 1 tablet by mouth once        simvastatin 20 MG tablet    ZOCOR     Take  by mouth At Bedtime.

## 2018-05-20 NOTE — PROGRESS NOTES
SUBJECTIVE:   Elijah Pavon is a 77 year old male presenting for evaluation of   Chief Complaint   Patient presents with     Fall     Patient fell in a parking lot and hit top of head on the concrete today.  He lost the feeling in his left leg secondary to spinal surgery and fell.     Patient was walking in a parking lot and his left leg gave out from under him. This has been happening for a while since his recent spine surgery. He has spoke with his surgeon about this.  He fell and hit the back of his head on the concrete pavement. He sustained a hematoma and a laceration to the scalp. He denies LOC. He does not take blood thinners.     History brief to limit patient's wait time to triage.      ROS   See HPI      PMH:  Past Medical History:   Diagnosis Date     Diabetes mellitus (H) 2008     Macular degeneration      Nonsenile cataract      Patient Active Problem List    Diagnosis Date Noted     Macular degeneration, dry, mild, ou 01/02/2017     Priority: Medium     Type 2 diabetes mellitus without complication, without long-term current use of insulin (H) 01/02/2017     Priority: Medium     Posterior vitreous detachment, bilateral 12/30/2015     Priority: Medium     Pseudophakia, ou 11/15/2011     Priority: Medium         Current medications:  Current Outpatient Prescriptions   Medication Sig Dispense Refill     Aspirin (ASPIR-81 PO) Take  by mouth.       chlorthalidone (HYGROTEN) 25 MG tablet Take 25 mg by mouth daily.       metFORMIN (GLUCOPHAGE) 500 MG tablet Take 500 mg by mouth 2 times daily (with meals).       Multiple Vitamin (MULTI-VITAMIN DAILY PO) Take 1 tablet by mouth once       simvastatin (ZOCOR) 20 MG tablet Take  by mouth At Bedtime.       valsartan (DIOVAN) 320 MG tablet Take 1 tablet by mouth daily.         Surgical History:  Past Surgical History:   Procedure Laterality Date     CATARACT IOL, RT/LT       PHACOEMULSIFICATION WITH STANDARD INTRAOCULAR LENS IMPLANT  11/2011; 3/2017    left eye;  right eye       Family history:  Family History   Problem Relation Age of Onset     CEREBROVASCULAR DISEASE Father      CANCER No family hx of      DIABETES No family hx of      Hypertension No family hx of      Thyroid Disease No family hx of      Glaucoma No family hx of      Macular Degeneration No family hx of          Social History:  Social History   Substance Use Topics     Smoking status: Former Smoker     Quit date: 1/1/1970     Smokeless tobacco: Not on file     Alcohol use Not on file           OBJECTIVE  /78 (BP Location: Right arm, Patient Position: Sitting, Cuff Size: Adult Regular)  Pulse 98  Temp 97.4  F (36.3  C) (Oral)  SpO2 96%    Physical Exam  General: alert, appears well, NAD. Afebrile.  HEENT: Normocephalic. 5cm hematoma on posterior crown of head.   Eyes: conjunctiva clear.  Neck: supple.  Respiratory: No distress.  Neurologic: Follows commands. Gait normal. Speech intact.  Psychiatric: mentation appears normal and affect normal/bright             ASSESSMENT:      ICD-10-CM    1. Hematoma of scalp, initial encounter S00.03XA    2. Fall, initial encounter W19.XXXA         Medical Decision Making:    Per [place] Head CT rules, patient is >66yo and has a posterior scalp hematoma. He needs head CT to evaluate for possibility of intracranial bleed. Discussed with patient that he will need to be seen in the ED. He and his wife understood and agreed.  Patient to [facility] via private car - wife driving. He is appropriate for transport via private car as he is GCS 15, mentating normally, and hemodynamically stable.           Zandra Doss PA-C  05/20/18 5:53 PM

## 2018-05-20 NOTE — NURSING NOTE
I applied a bandage to wound before patient left.    Steph CHAHAL, Certified Medical Assistant (AAMA)May 20, 2018 5:08 PM

## 2018-07-12 ENCOUNTER — OFFICE VISIT (OUTPATIENT)
Dept: OPHTHALMOLOGY | Facility: CLINIC | Age: 78
End: 2018-07-12
Payer: COMMERCIAL

## 2018-07-12 DIAGNOSIS — H35.30 MACULAR DEGENERATION: ICD-10-CM

## 2018-07-12 DIAGNOSIS — E11.9 TYPE 2 DIABETES MELLITUS WITHOUT COMPLICATION, WITHOUT LONG-TERM CURRENT USE OF INSULIN (H): ICD-10-CM

## 2018-07-12 DIAGNOSIS — H52.4 PRESBYOPIA: ICD-10-CM

## 2018-07-12 DIAGNOSIS — Z96.1 PSEUDOPHAKIA: ICD-10-CM

## 2018-07-12 DIAGNOSIS — H43.813 POSTERIOR VITREOUS DETACHMENT, BILATERAL: ICD-10-CM

## 2018-07-12 DIAGNOSIS — Z01.01 ENCOUNTER FOR EXAMINATION OF EYES AND VISION WITH ABNORMAL FINDINGS: Primary | ICD-10-CM

## 2018-07-12 PROCEDURE — 92015 DETERMINE REFRACTIVE STATE: CPT | Performed by: OPHTHALMOLOGY

## 2018-07-12 PROCEDURE — 92014 COMPRE OPH EXAM EST PT 1/>: CPT | Performed by: OPHTHALMOLOGY

## 2018-07-12 ASSESSMENT — TONOMETRY
OD_IOP_MMHG: 12
OS_IOP_MMHG: 12
IOP_METHOD: APPLANATION

## 2018-07-12 ASSESSMENT — VISUAL ACUITY
OS_CC: 20/25
OD_CC: J7
OD_CC: 20/30
OS_CC: J2
OS_CC+: -2
METHOD: SNELLEN - LINEAR

## 2018-07-12 ASSESSMENT — CONF VISUAL FIELD
OS_SUPERIOR_NASAL_RESTRICTION: 3
OD_NORMAL: 1

## 2018-07-12 ASSESSMENT — CUP TO DISC RATIO
OD_RATIO: 0.3
OS_RATIO: 0.3

## 2018-07-12 ASSESSMENT — REFRACTION_MANIFEST
OS_ADD: +3.00
OS_AXIS: 178
OS_SPHERE: -2.00
OD_CYLINDER: +3.00
OD_AXIS: 013
OD_ADD: +3.00
OD_SPHERE: -1.50
OS_CYLINDER: +3.50

## 2018-07-12 ASSESSMENT — REFRACTION_WEARINGRX
OD_SPHERE: -1.75
SPECS_TYPE: TRIFOCAL
OD_ADD: +3.25
OS_CYLINDER: +2.50
OS_SPHERE: -2.25
OS_AXIS: 178
OS_ADD: +3.25
OD_AXIS: 010
OD_CYLINDER: +2.25

## 2018-07-12 ASSESSMENT — EXTERNAL EXAM - LEFT EYE: OS_EXAM: PROLAPSED FAT PADS: UPPER, LOWER

## 2018-07-12 ASSESSMENT — EXTERNAL EXAM - RIGHT EYE: OD_EXAM: PROLAPSED FAT PADS: UPPER, LOWER

## 2018-07-12 ASSESSMENT — SLIT LAMP EXAM - LIDS
COMMENTS: 2+ DERMATOCHALASIS - UPPER LID
COMMENTS: 2+ DERMATOCHALASIS - UPPER LID

## 2018-07-12 NOTE — MR AVS SNAPSHOT
"              After Visit Summary   7/12/2018    Elijah Pavon    MRN: [id number]           Patient Information     Date Of Birth          1940        Visit Information        Provider Department      7/12/2018 10:00 AM Kodak Hurley MD [facility]        Today's Diagnoses     Encounter for examination of eyes and vision with abnormal findings    -  1    Presbyopia        Macular degeneration, dry, mild, ou        Posterior vitreous detachment, bilateral        Pseudophakia, ou          Care Instructions    Glasses Rx given - optional.  Possible clouding of posterior capsule both eyes discussed.  Ok to try Allergy eye drops both eyes as needed (Zaditor).  Use artificial tears up to 4 times daily both eyes as needed.  (Refresh Tears, Systane Ultra/Balance, or Theratears)  Take a multiple vitamin or \"eye vitamin\" daily (AREDS2).  Protect your eyes outdoors from ultraviolet rays with sunglasses and/or brimmed hat.  Have spinach (cooked or raw), colorful fruits, walnuts, hazelnuts, almonds in your diet.  Monitor the vision in each eye weekly - call if any sudden persistent changes.  Call in March 2019 for an appointment in July 2019 for Complete Exam.    Dr. Hurley [phone]    Diabetes weakens the blood vessels all over the body, including the eyes. Damage to the blood vessels in the eyes can cause swelling or bleeding into part of the eye (called the retina). This is called diabetic retinopathy ([facility]-tin--[facility]-the). If not treated, this disease can cause vision loss or blindness.   Symptoms may include blurred or distorted vision, but many people have no symptoms. It's important to see your eye doctor regularly to check for problems.   Early treatment and good control can help protect your vision. Here are the things you can do to help prevent vision loss:      1. Keep your blood sugar levels under tight control.      2. Bring high blood pressure under control.      3. No smoking.      4. " Have yearly dilated eye exams.            Follow-ups after your visit        Who to contact     If you have questions or need follow up information about today's clinic visit or your schedule please contact [facility] directly at [phone].  Normal or non-critical lab and imaging results will be communicated to you by MyChart, letter or phone within 4 business days after the clinic has received the results. If you do not hear from us within 7 days, please contact the clinic through MyChart or phone. If you have a critical or abnormal lab result, we will notify you by phone as soon as possible.  Submit refill requests through [employer] or call your pharmacy and they will forward the refill request to us. Please allow 3 business days for your refill to be completed.          Additional Information About Your Visit        Care EveryWhere ID     This is your Care EveryWhere ID. This could be used by other organizations to access your [place] medical records  [id number]         Blood Pressure from Last 3 Encounters:   05/20/18 145/78    Weight from Last 3 Encounters:   No data found for Wt              Today, you had the following     No orders found for display       Primary Care Provider Office Phone # Fax #    [place] [phone]       [facility] [id number] [place] [address]        Equal Access to Services     EVELYNE LINARES : Hadii aad ku hadasho Soomaali, waaxda luqadaha, qaybta kaalmada adeegyada, waxay idiin deb keane. So [facility] [phone].    ATENCIÓN: Si habla español, tiene a cerrato disposición servicios gratuitos de asistencia lingüística. Llame al [phone].    We comply with applicable federal civil rights laws and [place] laws. We do not discriminate on the basis of race, color, national origin, age, disability, sex, sexual orientation, or gender identity.            Thank you!     Thank you for choosing [facility]  [facility]  for your care. Our goal is always to provide you with excellent care. Hearing back from our patients is one way we can continue to improve our services. Please take a few minutes to complete the written survey that you may receive in the mail after your visit with us. Thank you!             Your Updated Medication List - Protect others around you: Learn how to safely use, store and throw away your medicines at www.disposemymeds.org.          This list is accurate as of 7/12/18 11:23 AM.  Always use your most recent med list.                   Brand Name Dispense Instructions for use Diagnosis    ASPIR-81 PO      Take  by mouth.        chlorthalidone 25 MG tablet    HYGROTON     Take 25 mg by mouth daily.        DIOVAN 320 MG tablet   Generic drug:  valsartan      Take 1 tablet by mouth daily.        metFORMIN 500 MG tablet    GLUCOPHAGE     Take 500 mg by mouth 2 times daily (with meals).        MULTI-VITAMIN DAILY PO      Take 1 tablet by mouth once        simvastatin 20 MG tablet    ZOCOR     Take  by mouth At Bedtime.

## 2018-07-12 NOTE — PROGRESS NOTES
" Current Eye Medications:  None     Subjective:  Complete eye exam       Both eyes watering and itchng as usual in the spring and summer months.  \"Have had allergies for years.\"  Never has used any eye drops for relief.  Patient had spinal surgery in April and is still recovering from that.  No visual complaints.     Objective:  See Ophthalmology Exam.       Assessment:  Stable eye exam.  No diabetic retinopathy.      ICD-10-CM    1. Encounter for examination of eyes and vision with abnormal findings Z01.01 REFRACTIVE STATUS   2. Presbyopia H52.4 REFRACTIVE STATUS   3. Type 2 diabetes mellitus without complication, without long-term current use of insulin (H) E11.9 EYE EXAM (SIMPLE-NONBILLABLE)   4. Pseudophakia, ou Z96.1    5. Macular degeneration, dry, mild, ou H35.30    6. Posterior vitreous detachment, bilateral H43.813         Plan:  Glasses Rx given - optional.  Possible clouding of posterior capsule both eyes discussed.  Ok to try Allergy eye drops both eyes as needed (Zaditor).  Use artificial tears up to 4 times daily both eyes as needed.  (Refresh Tears, Systane Ultra/Balance, or Theratears)  Take a multiple vitamin or \"eye vitamin\" daily (AREDS2).  Protect your eyes outdoors from ultraviolet rays with sunglasses and/or brimmed hat.  Have spinach (cooked or raw), colorful fruits, walnuts, hazelnuts, almonds in your diet.  Monitor the vision in each eye weekly - call if any sudden persistent changes.  Call in March 2019 for an appointment in July 2019 for Complete Exam.    Dr. Hurley [phone]         "

## 2018-07-12 NOTE — LETTER
"    7/12/2018         RE: Elijah Pavon  [address]  [place] [address]        Dear Colleague,    Thank you for referring your patient, Elijah Pavon, to the [facility]. Please see a copy of my visit note below.     Current Eye Medications:  None     Subjective:  Complete eye exam       Both eyes watering and itchng as usual in the spring and summer months.  \"Have had allergies for years.\"  Never has used any eye drops for relief.  Patient had spinal surgery in April and is still recovering from that.  No visual complaints.     Objective:  See Ophthalmology Exam.       Assessment:  Stable eye exam.  No diabetic retinopathy.      ICD-10-CM    1. Encounter for examination of eyes and vision with abnormal findings Z01.01 REFRACTIVE STATUS   2. Presbyopia H52.4 REFRACTIVE STATUS   3. Type 2 diabetes mellitus without complication, without long-term current use of insulin (H) E11.9 EYE EXAM (SIMPLE-NONBILLABLE)   4. Pseudophakia, ou Z96.1    5. Macular degeneration, dry, mild, ou H35.30    6. Posterior vitreous detachment, bilateral H43.813         Plan:  Glasses Rx given - optional.  Possible clouding of posterior capsule both eyes discussed.  Ok to try Allergy eye drops both eyes as needed (Zaditor).  Use artificial tears up to 4 times daily both eyes as needed.  (Refresh Tears, Systane Ultra/Balance, or Theratears)  Take a multiple vitamin or \"eye vitamin\" daily (AREDS2).  Protect your eyes outdoors from ultraviolet rays with sunglasses and/or brimmed hat.  Have spinach (cooked or raw), colorful fruits, walnuts, hazelnuts, almonds in your diet.  Monitor the vision in each eye weekly - call if any sudden persistent changes.  Call in March 2019 for an appointment in July 2019 for Complete Exam.    Dr. Hurley [phone]           Again, thank you for allowing me to participate in the care of your patient.        Sincerely,        Kodak Hurley MD    "

## 2018-07-12 NOTE — PATIENT INSTRUCTIONS
"Glasses Rx given - optional.  Possible clouding of posterior capsule both eyes discussed.  Ok to try Allergy eye drops both eyes as needed (Zaditor).  Use artificial tears up to 4 times daily both eyes as needed.  (Refresh Tears, Systane Ultra/Balance, or Theratears)  Take a multiple vitamin or \"eye vitamin\" daily (AREDS2).  Protect your eyes outdoors from ultraviolet rays with sunglasses and/or brimmed hat.  Have spinach (cooked or raw), colorful fruits, walnuts, hazelnuts, almonds in your diet.  Monitor the vision in each eye weekly - call if any sudden persistent changes.  Call in March 2019 for an appointment in July 2019 for Complete Exam.    Dr. Hurley [phone]    Diabetes weakens the blood vessels all over the body, including the eyes. Damage to the blood vessels in the eyes can cause swelling or bleeding into part of the eye (called the retina). This is called diabetic retinopathy ([facility]-tin--[facility]-the). If not treated, this disease can cause vision loss or blindness.   Symptoms may include blurred or distorted vision, but many people have no symptoms. It's important to see your eye doctor regularly to check for problems.   Early treatment and good control can help protect your vision. Here are the things you can do to help prevent vision loss:      1. Keep your blood sugar levels under tight control.      2. Bring high blood pressure under control.      3. No smoking.      4. Have yearly dilated eye exams.    "

## 2019-04-08 ENCOUNTER — DOCUMENTATION ONLY (OUTPATIENT)
Dept: OPHTHALMOLOGY | Facility: CLINIC | Age: 79
End: 2019-04-08

## 2019-07-15 ENCOUNTER — OFFICE VISIT (OUTPATIENT)
Dept: OPHTHALMOLOGY | Facility: CLINIC | Age: 79
End: 2019-07-15
Payer: COMMERCIAL

## 2019-07-15 DIAGNOSIS — H35.3131 EARLY DRY STAGE NONEXUDATIVE AGE-RELATED MACULAR DEGENERATION OF BOTH EYES: ICD-10-CM

## 2019-07-15 DIAGNOSIS — E11.9 TYPE 2 DIABETES MELLITUS WITHOUT COMPLICATION, WITHOUT LONG-TERM CURRENT USE OF INSULIN (H): ICD-10-CM

## 2019-07-15 DIAGNOSIS — Z01.01 ENCOUNTER FOR EXAMINATION OF EYES AND VISION WITH ABNORMAL FINDINGS: Primary | ICD-10-CM

## 2019-07-15 DIAGNOSIS — Z96.1 PSEUDOPHAKIA: ICD-10-CM

## 2019-07-15 DIAGNOSIS — H52.4 PRESBYOPIA: ICD-10-CM

## 2019-07-15 DIAGNOSIS — H43.813 POSTERIOR VITREOUS DETACHMENT, BILATERAL: ICD-10-CM

## 2019-07-15 PROCEDURE — 92015 DETERMINE REFRACTIVE STATE: CPT | Performed by: OPHTHALMOLOGY

## 2019-07-15 PROCEDURE — 92014 COMPRE OPH EXAM EST PT 1/>: CPT | Performed by: OPHTHALMOLOGY

## 2019-07-15 ASSESSMENT — VISUAL ACUITY
CORRECTION_TYPE: GLASSES
OD_CC+: -2
METHOD: SNELLEN - LINEAR
OD_CC: 20/30

## 2019-07-15 ASSESSMENT — REFRACTION_MANIFEST
OS_ADD: +3.00
OS_CYLINDER: +2.75
OD_CYLINDER: +3.25
OD_AXIS: 014
OD_SPHERE: -1.50
OS_AXIS: 170
OS_SPHERE: -2.25
OD_ADD: +3.00

## 2019-07-15 ASSESSMENT — REFRACTION_WEARINGRX
OS_ADD: +3.25
SPECS_TYPE: TRIFOCAL
OS_CYLINDER: +2.50
OD_SPHERE: -1.75
OS_AXIS: 178
OD_AXIS: 010
OD_CYLINDER: +2.25
OS_SPHERE: -2.25
OD_ADD: +3.25

## 2019-07-15 ASSESSMENT — EXTERNAL EXAM - RIGHT EYE: OD_EXAM: PROLAPSED FAT PADS: UPPER, LOWER

## 2019-07-15 ASSESSMENT — SLIT LAMP EXAM - LIDS
COMMENTS: 2+ DERMATOCHALASIS - UPPER LID
COMMENTS: 2+ DERMATOCHALASIS - UPPER LID

## 2019-07-15 ASSESSMENT — TONOMETRY
OS_IOP_MMHG: 13
IOP_METHOD: APPLANATION
OD_IOP_MMHG: 14

## 2019-07-15 ASSESSMENT — CUP TO DISC RATIO
OS_RATIO: 0.3
OD_RATIO: 0.3

## 2019-07-15 ASSESSMENT — EXTERNAL EXAM - LEFT EYE: OS_EXAM: PROLAPSED FAT PADS: UPPER, LOWER

## 2019-07-15 ASSESSMENT — CONF VISUAL FIELD
OS_NORMAL: 1
OD_NORMAL: 1

## 2019-07-15 NOTE — PROGRESS NOTES
" Current Eye Medications: none     Subjective:  Here for complete. DM, last a1c was 5.8 on 12-14-18. Had meniscus surgery just recently. No flashes or floaters.     Objective:  See Ophthalmology Exam.       Assessment:  Stable eye exam.  No diabetic retinopathy.      ICD-10-CM    1. Encounter for examination of eyes and vision with abnormal findings Z01.01 REFRACTIVE STATUS   2. Presbyopia H52.4 REFRACTIVE STATUS   3. Type 2 diabetes mellitus without complication, without long-term current use of insulin (H) E11.9 EYE EXAM (SIMPLE-NONBILLABLE)   4. Pseudophakia, ou Z96.1    5. Early dry stage nonexudative age-related macular degeneration of both eyes H35.3130    6. Posterior vitreous detachment, bilateral H43.817         Plan:  Possible clouding of posterior capsule both eyes discussed.  Glasses Rx given - optional   Take a multiple vitamin or \"eye vitamin\" daily (AREDS2).  Protect your eyes outdoors from ultraviolet rays with sunglasses and/or brimmed hat.  Have spinach (cooked or raw), colorful fruits, walnuts, hazelnuts, almonds in your diet.  Monitor the vision in each eye weekly - call if any sudden persistent changes.   Call in March 2020 for an appointment in July 2020 for Complete Exam.    Dr. Hurley [phone]       "

## 2019-07-15 NOTE — PATIENT INSTRUCTIONS
"Possible clouding of posterior capsule both eyes discussed.  Glasses Rx given - optional   Take a multiple vitamin or \"eye vitamin\" daily (AREDS2).  Protect your eyes outdoors from ultraviolet rays with sunglasses and/or brimmed hat.  Have spinach (cooked or raw), colorful fruits, walnuts, hazelnuts, almonds in your diet.  Monitor the vision in each eye weekly - call if any sudden persistent changes.   Call in March 2020 for an appointment in July 2020 for Complete Exam.    Dr. Hurley [phone]    Patient Education   Diabetes weakens the blood vessels all over the body, including the eyes. Damage to the blood vessels in the eyes can cause swelling or bleeding into part of the eye (called the retina). This is called diabetic retinopathy ([facility]-tin--[facility]-the). If not treated, this disease can cause vision loss or blindness.   Symptoms may include blurred or distorted vision, but many people have no symptoms. It's important to see your eye doctor regularly to check for problems.   Early treatment and good control can help protect your vision. Here are the things you can do to help prevent vision loss:      1. Keep your blood sugar levels under tight control.      2. Bring high blood pressure under control.      3. No smoking.      4. Have yearly dilated eye exams.       "

## 2019-07-15 NOTE — LETTER
"    7/15/2019         RE: Elijah Pavon  [address]  [place] [address]        Dear Colleague,    Thank you for referring your patient, Elijah Pavon, to the [facility]. Please see a copy of my visit note below.     Current Eye Medications: none     Subjective:  Here for complete. DM, last a1c was 5.8 on 12-14-18. Had meniscus surgery just recently. No flashes or floaters.     Objective:  See Ophthalmology Exam.       Assessment:  Stable eye exam.  No diabetic retinopathy.      ICD-10-CM    1. Encounter for examination of eyes and vision with abnormal findings Z01.01 REFRACTIVE STATUS   2. Presbyopia H52.4 REFRACTIVE STATUS   3. Type 2 diabetes mellitus without complication, without long-term current use of insulin (H) E11.9 EYE EXAM (SIMPLE-NONBILLABLE)   4. Pseudophakia, ou Z96.1    5. Early dry stage nonexudative age-related macular degeneration of both eyes H35.3138    6. Posterior vitreous detachment, bilateral H43.813         Plan:  Possible clouding of posterior capsule both eyes discussed.  Glasses Rx given - optional   Take a multiple vitamin or \"eye vitamin\" daily (AREDS2).  Protect your eyes outdoors from ultraviolet rays with sunglasses and/or brimmed hat.  Have spinach (cooked or raw), colorful fruits, walnuts, hazelnuts, almonds in your diet.  Monitor the vision in each eye weekly - call if any sudden persistent changes.   Call in March 2020 for an appointment in July 2020 for Complete Exam.    Dr. Hurley [phone]         Again, thank you for allowing me to participate in the care of your patient.        Sincerely,        Kodak Hurley MD    "

## 2019-07-21 PROBLEM — H35.3131 EARLY DRY STAGE NONEXUDATIVE AGE-RELATED MACULAR DEGENERATION OF BOTH EYES: Status: ACTIVE | Noted: 2019-07-21

## 2022-03-15 ENCOUNTER — OFFICE VISIT (OUTPATIENT)
Dept: OPHTHALMOLOGY | Facility: CLINIC | Age: 82
End: 2022-03-15
Payer: COMMERCIAL

## 2022-03-15 DIAGNOSIS — H43.813 POSTERIOR VITREOUS DETACHMENT, BILATERAL: ICD-10-CM

## 2022-03-15 DIAGNOSIS — H52.4 PRESBYOPIA: ICD-10-CM

## 2022-03-15 DIAGNOSIS — Z01.01 ENCOUNTER FOR EXAMINATION OF EYES AND VISION WITH ABNORMAL FINDINGS: Primary | ICD-10-CM

## 2022-03-15 DIAGNOSIS — H26.492 POSTERIOR CAPSULAR OPACIFICATION VISUALLY SIGNIFICANT OF LEFT EYE: ICD-10-CM

## 2022-03-15 DIAGNOSIS — Z96.1 PSEUDOPHAKIA: ICD-10-CM

## 2022-03-15 DIAGNOSIS — H35.3131 EARLY DRY STAGE NONEXUDATIVE AGE-RELATED MACULAR DEGENERATION OF BOTH EYES: ICD-10-CM

## 2022-03-15 DIAGNOSIS — E11.9 TYPE 2 DIABETES MELLITUS WITHOUT COMPLICATION, WITHOUT LONG-TERM CURRENT USE OF INSULIN (H): ICD-10-CM

## 2022-03-15 PROCEDURE — 92014 COMPRE OPH EXAM EST PT 1/>: CPT | Performed by: OPHTHALMOLOGY

## 2022-03-15 PROCEDURE — 92015 DETERMINE REFRACTIVE STATE: CPT | Performed by: OPHTHALMOLOGY

## 2022-03-15 ASSESSMENT — REFRACTION_MANIFEST
OS_ADD: +2.50
OD_SPHERE: -1.50
OD_ADD: +2.50
OS_SPHERE: -2.25
OS_AXIS: 175
OD_CYLINDER: +3.25
OS_CYLINDER: +3.00
OD_AXIS: 015

## 2022-03-15 ASSESSMENT — TONOMETRY
OD_IOP_MMHG: 13
IOP_METHOD: APPLANATION
OS_IOP_MMHG: 13

## 2022-03-15 ASSESSMENT — EXTERNAL EXAM - RIGHT EYE: OD_EXAM: PROLAPSED FAT PADS: UPPER, LOWER

## 2022-03-15 ASSESSMENT — EXTERNAL EXAM - LEFT EYE: OS_EXAM: PROLAPSED FAT PADS: UPPER, LOWER

## 2022-03-15 ASSESSMENT — CUP TO DISC RATIO
OD_RATIO: 0.3
OS_RATIO: 0.3

## 2022-03-15 ASSESSMENT — VISUAL ACUITY
OS_CC: 20/25
OD_CC+: -1
CORRECTION_TYPE: GLASSES
OD_CC: 20/30
METHOD: SNELLEN - LINEAR
OS_CC+: -1

## 2022-03-15 ASSESSMENT — SLIT LAMP EXAM - LIDS
COMMENTS: 2+ DERMATOCHALASIS - UPPER LID
COMMENTS: 2+ DERMATOCHALASIS - UPPER LID

## 2022-03-15 ASSESSMENT — CONF VISUAL FIELD
OS_NORMAL: 1
METHOD: COUNTING FINGERS
OD_NORMAL: 1

## 2022-03-15 NOTE — PATIENT INSTRUCTIONS
"Glasses prescription given - optional  Use artificial tears up to four times a day (like Refresh Optive, Systane Balance, TheraTears, or generic artificial tears are ok. Avoid \"get the red out\" drops).   Possible clouding of posterior capsule both eyes discussed.   Call in November 2022 for an appointment in March 2023 for Complete Exam    Dr. Hurley [phone]     Patient Education   Diabetes weakens the blood vessels all over the body, including the eyes. Damage to the blood vessels in the eyes can cause swelling or bleeding into part of the eye (called the retina). This is called diabetic retinopathy ([facility]). If not treated, this disease can cause vision loss or blindness.   Symptoms may include blurred or distorted vision, but many people have no symptoms. It's important to see your eye doctor regularly to check for problems.   Early treatment and good control can help protect your vision. Here are the things you can do to help prevent vision loss:      1. Keep your blood sugar levels under tight control.      2. Bring high blood pressure under control.      3. No smoking.      4. Have yearly dilated eye exams.      "

## 2022-03-15 NOTE — LETTER
"    3/15/2022         RE: Elijah Pavon  [address]  [place] [address]        Dear Colleague,    Thank you for referring your patient, Elijah Pavon, to the [facility]. Please see a copy of my visit note below.     Current Eye Medications:  None     Subjective:  Complete eye exam. Vision is doing well both eyes distance and near. No eye pain or discomfort in either eye.   Type 2 diabetic, patient thinks blood sugar has been fine.     1 yr ago 02/03/2021   HEMOGLOBIN A1C SCREENING <=6.4 % 5.9         Objective:  See Ophthalmology Exam.       Assessment:  Stable eye exam.  No diabetic retinopathy.  Posterior capsule opacity left eye visually significant but patient happy with current status.      ICD-10-CM    1. Encounter for examination of eyes and vision with abnormal findings  Z01.01    2. Presbyopia  H52.4    3. Type 2 diabetes mellitus without complication, without long-term current use of insulin (H)  E11.9    4. Pseudophakia, ou  Z96.1    5. Early dry stage nonexudative age-related macular degeneration of both eyes  H35.3131    6. Posterior capsular opacification visually significant of left eye  H26.492    7. Posterior vitreous detachment, bilateral  H43.813         Plan:  Glasses prescription given - optional  Use artificial tears up to four times a day (like Refresh Optive, Systane Balance, TheraTears, or generic artificial tears are ok. Avoid \"get the red out\" drops).   Possible clouding of posterior capsule both eyes discussed.   Call in November 2022 for an appointment in March 2023 for Complete Exam    Dr. Hurley [phone]           Again, thank you for allowing me to participate in the care of your patient.        Sincerely,        Kodak Hurley MD    "

## 2022-03-15 NOTE — PROGRESS NOTES
" Current Eye Medications:  None     Subjective:  Complete eye exam. Vision is doing well both eyes distance and near. No eye pain or discomfort in either eye.   Type 2 diabetic, patient thinks blood sugar has been fine.     1 yr ago 02/03/2021   HEMOGLOBIN A1C SCREENING <=6.4 % 5.9         Objective:  See Ophthalmology Exam.       Assessment:  Stable eye exam.  No diabetic retinopathy.  Posterior capsule opacity left eye visually significant but patient happy with current status.      ICD-10-CM    1. Encounter for examination of eyes and vision with abnormal findings  Z01.01    2. Presbyopia  H52.4    3. Type 2 diabetes mellitus without complication, without long-term current use of insulin (H)  E11.9    4. Pseudophakia, ou  Z96.1    5. Early dry stage nonexudative age-related macular degeneration of both eyes  H35.3138    6. Posterior capsular opacification visually significant of left eye  H26.492    7. Posterior vitreous detachment, bilateral  H43.813         Plan:  Glasses prescription given - optional  Use artificial tears up to four times a day (like Refresh Optive, Systane Balance, TheraTears, or generic artificial tears are ok. Avoid \"get the red out\" drops).   Possible clouding of posterior capsule both eyes discussed.   Call in November 2022 for an appointment in March 2023 for Complete Exam    Dr. Hurley [phone]       "

## 2022-08-29 ENCOUNTER — OFFICE VISIT (OUTPATIENT)
Dept: URGENT CARE | Facility: URGENT CARE | Age: 82
End: 2022-08-29
Payer: COMMERCIAL

## 2022-08-29 VITALS
DIASTOLIC BLOOD PRESSURE: 75 MMHG | TEMPERATURE: 98.2 F | RESPIRATION RATE: 16 BRPM | HEART RATE: 58 BPM | SYSTOLIC BLOOD PRESSURE: 158 MMHG | OXYGEN SATURATION: 99 %

## 2022-08-29 DIAGNOSIS — M10.071 ACUTE IDIOPATHIC GOUT INVOLVING TOE OF RIGHT FOOT: Primary | ICD-10-CM

## 2022-08-29 PROCEDURE — 99213 OFFICE O/P EST LOW 20 MIN: CPT | Performed by: PHYSICIAN ASSISTANT

## 2022-08-29 PROCEDURE — 36415 COLL VENOUS BLD VENIPUNCTURE: CPT | Performed by: PHYSICIAN ASSISTANT

## 2022-08-29 PROCEDURE — 84550 ASSAY OF BLOOD/URIC ACID: CPT | Performed by: PHYSICIAN ASSISTANT

## 2022-08-29 RX ORDER — PREDNISONE 50 MG/1
50 TABLET ORAL DAILY
Qty: 6 TABLET | Refills: 0 | Status: SHIPPED | OUTPATIENT
Start: 2022-08-29 | End: 2022-09-04

## 2022-08-29 NOTE — PROGRESS NOTES
SUBJECTIVE:  Elijah Pavon is a 81 year old male here with his wife for right foot pain 1 days ago. Mechanism of injury: no injury .  He has had gout many years ago.  He is diabetic.  Not a huge red meat eater or drink alcohol       OBJECTIVE:    BP (!) 158/75   Pulse 58   Temp 98.2  F (36.8  C) (Oral)   Resp 16   SpO2 99%     Vital signs as noted above.  Appearance: in no apparent distress.  Foot/ankle exam: right foot soft tissue swelling and tenderness at the MTP. Good DP pulses.  Ankle has normal ROM        ASSESSMENT:   Diagnosis Comments   1. Acute idiopathic gout involving toe of right foot  Uric acid, predniSONE (DELTASONE) 50 MG tablet          PLAN:  Uric acid pending  Prednisone start today  [facility]

## 2022-08-30 LAB — URATE SERPL-MCNC: 7.8 MG/DL (ref 3.5–7.2)

## 2023-09-18 ENCOUNTER — OFFICE VISIT (OUTPATIENT)
Dept: OPHTHALMOLOGY | Facility: CLINIC | Age: 83
End: 2023-09-18
Payer: COMMERCIAL

## 2023-09-18 DIAGNOSIS — H43.813 POSTERIOR VITREOUS DETACHMENT, BILATERAL: ICD-10-CM

## 2023-09-18 DIAGNOSIS — H52.4 PRESBYOPIA: ICD-10-CM

## 2023-09-18 DIAGNOSIS — Z01.01 ENCOUNTER FOR EXAMINATION OF EYES AND VISION WITH ABNORMAL FINDINGS: Primary | ICD-10-CM

## 2023-09-18 DIAGNOSIS — H35.3131 EARLY DRY STAGE NONEXUDATIVE AGE-RELATED MACULAR DEGENERATION OF BOTH EYES: ICD-10-CM

## 2023-09-18 DIAGNOSIS — E11.9 TYPE 2 DIABETES MELLITUS WITHOUT COMPLICATION, WITHOUT LONG-TERM CURRENT USE OF INSULIN (H): ICD-10-CM

## 2023-09-18 DIAGNOSIS — Z96.1 PSEUDOPHAKIA: ICD-10-CM

## 2023-09-18 PROCEDURE — 92015 DETERMINE REFRACTIVE STATE: CPT | Performed by: OPHTHALMOLOGY

## 2023-09-18 PROCEDURE — 92014 COMPRE OPH EXAM EST PT 1/>: CPT | Performed by: OPHTHALMOLOGY

## 2023-09-18 ASSESSMENT — TONOMETRY
OD_IOP_MMHG: 15
OS_IOP_MMHG: 14
IOP_METHOD: APPLANATION

## 2023-09-18 ASSESSMENT — CONF VISUAL FIELD
OS_SUPERIOR_NASAL_RESTRICTION: 0
OS_SUPERIOR_TEMPORAL_RESTRICTION: 0
OD_NORMAL: 1
OD_SUPERIOR_TEMPORAL_RESTRICTION: 0
OS_NORMAL: 1
OD_SUPERIOR_NASAL_RESTRICTION: 0
OD_INFERIOR_NASAL_RESTRICTION: 0
OS_INFERIOR_NASAL_RESTRICTION: 0
OD_INFERIOR_TEMPORAL_RESTRICTION: 0
OS_INFERIOR_TEMPORAL_RESTRICTION: 0

## 2023-09-18 ASSESSMENT — CUP TO DISC RATIO
OS_RATIO: 0.3
OD_RATIO: 0.3

## 2023-09-18 ASSESSMENT — VISUAL ACUITY
METHOD: SNELLEN - LINEAR
OD_CC: J5
OD_CC+: -1
OS_CC: 20/25
OS_CC: J5
OD_CC: 20/30
CORRECTION_TYPE: GLASSES

## 2023-09-18 ASSESSMENT — REFRACTION_WEARINGRX
OD_ADD: +3.00
OS_AXIS: 177
OD_AXIS: 012
OS_CYLINDER: +3.00
OS_SPHERE: -2.50
OD_CYLINDER: +3.75
SPECS_TYPE: TRIFOCAL
OD_SPHERE: -1.75
OS_ADD: +2.75

## 2023-09-18 ASSESSMENT — REFRACTION_MANIFEST
OD_ADD: +3.00
OS_SPHERE: -2.00
OD_SPHERE: -1.75
OS_ADD: +3.00
OS_AXIS: 176
OS_CYLINDER: +3.00
OD_CYLINDER: +3.00
OD_AXIS: 012

## 2023-09-18 ASSESSMENT — SLIT LAMP EXAM - LIDS
COMMENTS: 2-3+ DERMATOCHALASIS - UPPER LID
COMMENTS: 2-3+ DERMATOCHALASIS - UPPER LID

## 2023-09-18 ASSESSMENT — EXTERNAL EXAM - RIGHT EYE: OD_EXAM: PROLAPSED FAT PADS: UPPER, LOWER

## 2023-09-18 ASSESSMENT — EXTERNAL EXAM - LEFT EYE: OS_EXAM: PROLAPSED FAT PADS: UPPER, LOWER

## 2023-09-18 NOTE — LETTER
"    9/18/2023         RE: Elijah Pavon  [address]  [place] [address]        Dear Colleague,    Thank you for referring your patient, Elijah Pavon, to the [facility]. Please see a copy of my visit note below.     Current Eye Medications:  none.      Subjective:  Comprehensive Eye Exam.   He states he is not a Diabetic.  No vision changes or concerns.      Objective:  See Ophthalmology Exam.       Assessment:  Stable eye exam in patient with diabetes.  No diabetic retinopathy.      ICD-10-CM    1. Encounter for examination of eyes and vision with abnormal findings  Z01.01       2. Presbyopia  H52.4       3. Type 2 diabetes mellitus without complication, without long-term current use of insulin (H)  E11.9       4. Pseudophakia, ou  Z96.1       5. Early dry stage nonexudative age-related macular degeneration of both eyes  H35.3133       6. Posterior vitreous detachment, bilateral  H43.813            Plan:  Glasses prescription given - optional    May use artificial tears up to four times a day (like Refresh Optive, Systane Balance, TheraTears, or generic artificial tears are ok. Avoid \"get the red out\" drops).     Possible clouding of posterior capsule both eyes discussed.     Call in May 2024 for an appointment in September 2024 for Complete Exam    Dr. Hurley [phone]         Again, thank you for allowing me to participate in the care of your patient.        Sincerely,        Kodak Hurley MD  "

## 2023-09-18 NOTE — PATIENT INSTRUCTIONS
"Glasses prescription given - optional    May use artificial tears up to four times a day (like Refresh Optive, Systane Balance, TheraTears, or generic artificial tears are ok. Avoid \"get the red out\" drops).     Possible clouding of posterior capsule both eyes discussed.     Call in May 2024 for an appointment in September 2024 for Complete Exam    Dr. Hurley [phone]    "

## 2023-09-18 NOTE — PROGRESS NOTES
" Current Eye Medications:  none.      Subjective:  Comprehensive Eye Exam.   He states he is not a Diabetic.  No vision changes or concerns.      Objective:  See Ophthalmology Exam.       Assessment:  Stable eye exam in patient with diabetes.  No diabetic retinopathy.      ICD-10-CM    1. Encounter for examination of eyes and vision with abnormal findings  Z01.01       2. Presbyopia  H52.4       3. Type 2 diabetes mellitus without complication, without long-term current use of insulin (H)  E11.9       4. Pseudophakia, ou  Z96.1       5. Early dry stage nonexudative age-related macular degeneration of both eyes  H35.3136       6. Posterior vitreous detachment, bilateral  H43.813            Plan:  Glasses prescription given - optional    May use artificial tears up to four times a day (like Refresh Optive, Systane Balance, TheraTears, or generic artificial tears are ok. Avoid \"get the red out\" drops).     Possible clouding of posterior capsule both eyes discussed.     Call in May 2024 for an appointment in September 2024 for Complete Exam    Dr. Hurley [phone]       "

## 2024-09-20 ENCOUNTER — OFFICE VISIT (OUTPATIENT)
Dept: OPHTHALMOLOGY | Facility: CLINIC | Age: 84
End: 2024-09-20
Payer: COMMERCIAL

## 2024-09-20 DIAGNOSIS — H52.4 PRESBYOPIA: ICD-10-CM

## 2024-09-20 DIAGNOSIS — E11.9 TYPE 2 DIABETES MELLITUS WITHOUT COMPLICATION, WITHOUT LONG-TERM CURRENT USE OF INSULIN (H): ICD-10-CM

## 2024-09-20 DIAGNOSIS — Z01.01 ENCOUNTER FOR EXAMINATION OF EYES AND VISION WITH ABNORMAL FINDINGS: Primary | ICD-10-CM

## 2024-09-20 DIAGNOSIS — H43.813 POSTERIOR VITREOUS DETACHMENT, BILATERAL: ICD-10-CM

## 2024-09-20 DIAGNOSIS — H35.3131 EARLY DRY STAGE NONEXUDATIVE AGE-RELATED MACULAR DEGENERATION OF BOTH EYES: ICD-10-CM

## 2024-09-20 DIAGNOSIS — Z96.1 PSEUDOPHAKIA: ICD-10-CM

## 2024-09-20 PROBLEM — M51.16 LUMBAR DISC HERNIATION WITH RADICULOPATHY: Status: ACTIVE | Noted: 2020-02-07

## 2024-09-20 PROBLEM — E53.8 VITAMIN B12 DEFICIENCY: Status: ACTIVE | Noted: 2021-02-19

## 2024-09-20 PROBLEM — M51.26 HERNIATION OF INTERVERTEBRAL DISC OF LUMBAR SPINE: Status: ACTIVE | Noted: 2018-11-13

## 2024-09-20 PROBLEM — M12.812 ROTATOR CUFF TEAR ARTHROPATHY OF LEFT SHOULDER: Chronic | Status: ACTIVE | Noted: 2023-01-12

## 2024-09-20 PROBLEM — R41.89 COGNITIVE DECLINE: Status: ACTIVE | Noted: 2024-06-13

## 2024-09-20 PROBLEM — F02.84 DEMENTIA IN OTHER DISEASES CLASSIFIED ELSEWHERE, UNSPECIFIED SEVERITY, WITH ANXIETY (H): Status: ACTIVE | Noted: 2024-06-11

## 2024-09-20 PROBLEM — R41.3 MEMORY LOSS: Status: ACTIVE | Noted: 2021-02-19

## 2024-09-20 PROBLEM — G30.9 ALZHEIMER'S DISEASE (H): Status: ACTIVE | Noted: 2024-06-11

## 2024-09-20 PROBLEM — N40.0 BPH (BENIGN PROSTATIC HYPERPLASIA): Status: ACTIVE | Noted: 2020-02-07

## 2024-09-20 PROBLEM — F02.80 ALZHEIMER'S DISEASE (H): Status: ACTIVE | Noted: 2024-06-11

## 2024-09-20 PROBLEM — M75.102 ROTATOR CUFF TEAR ARTHROPATHY OF LEFT SHOULDER: Chronic | Status: ACTIVE | Noted: 2023-01-12

## 2024-09-20 PROBLEM — M48.061 LUMBAR SPINAL STENOSIS: Status: ACTIVE | Noted: 2018-04-10

## 2024-09-20 PROBLEM — Z96.652 STATUS POST TOTAL LEFT KNEE REPLACEMENT: Status: ACTIVE | Noted: 2019-11-11

## 2024-09-20 PROCEDURE — 92014 COMPRE OPH EXAM EST PT 1/>: CPT | Performed by: OPHTHALMOLOGY

## 2024-09-20 PROCEDURE — 92015 DETERMINE REFRACTIVE STATE: CPT | Performed by: OPHTHALMOLOGY

## 2024-09-20 RX ORDER — LISINOPRIL AND HYDROCHLOROTHIAZIDE 12.5; 2 MG/1; MG/1
1 TABLET ORAL 2 TIMES DAILY
COMMUNITY
Start: 2024-06-13

## 2024-09-20 RX ORDER — AMLODIPINE BESYLATE 5 MG/1
5 TABLET ORAL DAILY
COMMUNITY
Start: 2024-06-13

## 2024-09-20 RX ORDER — FAMOTIDINE 20 MG
TABLET ORAL
COMMUNITY

## 2024-09-20 RX ORDER — ATENOLOL 50 MG/1
50 TABLET ORAL
COMMUNITY
Start: 2024-06-13

## 2024-09-20 RX ORDER — MEMANTINE HYDROCHLORIDE 10 MG/1
10 TABLET ORAL DAILY
COMMUNITY
Start: 2024-05-17

## 2024-09-20 RX ORDER — SERTRALINE HYDROCHLORIDE 25 MG/1
TABLET, FILM COATED ORAL
COMMUNITY
Start: 2022-12-13

## 2024-09-20 ASSESSMENT — CONF VISUAL FIELD
OD_INFERIOR_TEMPORAL_RESTRICTION: 0
OS_NORMAL: 1
OD_SUPERIOR_NASAL_RESTRICTION: 0
OD_SUPERIOR_TEMPORAL_RESTRICTION: 0
OD_INFERIOR_NASAL_RESTRICTION: 0
OS_SUPERIOR_NASAL_RESTRICTION: 0
OS_INFERIOR_NASAL_RESTRICTION: 0
OS_INFERIOR_TEMPORAL_RESTRICTION: 0
OS_SUPERIOR_TEMPORAL_RESTRICTION: 0
OD_NORMAL: 1

## 2024-09-20 ASSESSMENT — REFRACTION_WEARINGRX
OS_CYLINDER: +3.00
SPECS_TYPE: TRIFOCAL
OD_CYLINDER: +3.50
OS_SPHERE: -2.50
OS_ADD: +2.75
OD_SPHERE: -1.75
OD_AXIS: 018
OD_ADD: +3.00
OS_AXIS: 176

## 2024-09-20 ASSESSMENT — REFRACTION_MANIFEST
OS_SPHERE: -2.25
OD_ADD: +3.00
OS_CYLINDER: +2.25
OD_AXIS: 010
OD_SPHERE: -1.75
OD_CYLINDER: +3.00
OS_AXIS: 177
OS_ADD: +3.00

## 2024-09-20 ASSESSMENT — CUP TO DISC RATIO
OD_RATIO: 0.3
OS_RATIO: 0.3

## 2024-09-20 ASSESSMENT — EXTERNAL EXAM - LEFT EYE: OS_EXAM: PROLAPSED FAT PADS: UPPER, LOWER

## 2024-09-20 ASSESSMENT — VISUAL ACUITY
OS_CC: 20/25
OD_CC: J2-
OS_CC: J2-
METHOD: SNELLEN - LINEAR
CORRECTION_TYPE: GLASSES
OS_CC+: -1
OD_CC: 20/30

## 2024-09-20 ASSESSMENT — TONOMETRY
IOP_METHOD: APPLANATION
OS_IOP_MMHG: 17
OD_IOP_MMHG: 13

## 2024-09-20 ASSESSMENT — EXTERNAL EXAM - RIGHT EYE: OD_EXAM: PROLAPSED FAT PADS: UPPER, LOWER

## 2024-09-20 ASSESSMENT — SLIT LAMP EXAM - LIDS: COMMENTS: 2-3+ DERMATOCHALASIS - UPPER LID

## 2024-09-20 NOTE — PATIENT INSTRUCTIONS
"Glasses prescription given - optional    May use artificial tears up to four times a day (like Refresh Optive, Systane Balance, or TheraTears. Avoid \"get the red out\" drops and generic artifical tears).     Possible clouding of posterior capsule both eyes discussed.     Return visit in 6 months for an intraocular pressure check and dilation     Kodak Hurley M.D.  [phone]    Patient Education   Diabetes weakens the blood vessels all over the body, including the eyes. Damage to the blood vessels in the eyes can cause swelling or bleeding into part of the eye (called the retina). This is called diabetic retinopathy ([facility]-thee). If not treated, this disease can cause vision loss or blindness.   Symptoms may include blurred or distorted vision, but many people have no symptoms. It's important to see your eye doctor regularly to check for problems.   Early treatment and good control can help protect your vision. Here are the things you can do to help prevent vision loss:      1. Keep your blood sugar levels under tight control.      2. Bring high blood pressure under control.      3. No smoking.      4. Have yearly dilated eye exams.      "

## 2024-09-20 NOTE — PROGRESS NOTES
" Current Eye Medications:  none.      Subjective:  Patient is here for a Diabetic Eye Exam.   No results found for: \"A1C.\"  No vision changes or concerns.   He is unable to verify birth date or address, but I was able to get his wife to come in the room and verify his information and medications.     Objective:  See Ophthalmology Exam.       Assessment:  Posterior capsule opacities approaching visual significance both eyes; otherwise stable eye exam.  No diabetic retinopathy.      ICD-10-CM    1. Encounter for examination of eyes and vision with abnormal findings  Z01.01       2. Presbyopia  H52.4       3. Type 2 diabetes mellitus without complication, without long-term current use of insulin (H)  E11.9       4. Pseudophakia, ou  Z96.1       5. Early dry stage nonexudative age-related macular degeneration of both eyes  H35.3131       6. Posterior vitreous detachment, bilateral  H43.813            Plan:  Glasses prescription given - optional    May use artificial tears up to four times a day (like Refresh Optive, Systane Balance, or TheraTears. Avoid \"get the red out\" drops and generic artifical tears).     Possible clouding of posterior capsule both eyes discussed.     Return visit in 6 months for an intraocular pressure check and dilation     Kodak Hurley M.D.  [phone]       "

## 2024-09-20 NOTE — LETTER
"9/20/2024      Elijah Pavon  [address]   [place] [address]      Dear Colleague,    Thank you for referring your patient, Elijah Pavon, to the [facility]. Please see a copy of my visit note below.     Current Eye Medications:  none.      Subjective:  Patient is here for a Diabetic Eye Exam.   No results found for: \"A1C.\"  No vision changes or concerns.   He is unable to verify birth date or address, but I was able to get his wife to come in the room and verify his information and medications.     Objective:  See Ophthalmology Exam.       Assessment:  Posterior capsule opacities approaching visual significance both eyes; otherwise stable eye exam.  No diabetic retinopathy.      ICD-10-CM    1. Encounter for examination of eyes and vision with abnormal findings  Z01.01       2. Presbyopia  H52.4       3. Type 2 diabetes mellitus without complication, without long-term current use of insulin (H)  E11.9       4. Pseudophakia, ou  Z96.1       5. Early dry stage nonexudative age-related macular degeneration of both eyes  H35.3131       6. Posterior vitreous detachment, bilateral  H43.813            Plan:  Glasses prescription given - optional    May use artificial tears up to four times a day (like Refresh Optive, Systane Balance, or TheraTears. Avoid \"get the red out\" drops and generic artifical tears).     Possible clouding of posterior capsule both eyes discussed.     Return visit in 6 months for an intraocular pressure check and dilation     Kodak Hurley M.D.  [phone]         Again, thank you for allowing me to participate in the care of your patient.        Sincerely,        Kodak Hurley MD  "

## 2025-03-09 ENCOUNTER — LAB REQUISITION (OUTPATIENT)
Dept: LAB | Facility: CLINIC | Age: 85
End: 2025-03-09
Payer: COMMERCIAL

## 2025-03-09 DIAGNOSIS — R60.0 LOCALIZED EDEMA: ICD-10-CM

## 2025-03-11 LAB
BASOPHILS # BLD AUTO: 0 10E3/UL (ref 0–0.2)
BASOPHILS NFR BLD AUTO: 0 %
EOSINOPHIL # BLD AUTO: 0.1 10E3/UL (ref 0–0.7)
EOSINOPHIL NFR BLD AUTO: 1 %
ERYTHROCYTE [DISTWIDTH] IN BLOOD BY AUTOMATED COUNT: 14.9 % (ref 10–15)
HCT VFR BLD AUTO: 38.8 % (ref 40–53)
HGB BLD-MCNC: 12.1 G/DL (ref 13.3–17.7)
IMM GRANULOCYTES # BLD: 0.1 10E3/UL
IMM GRANULOCYTES NFR BLD: 1 %
LYMPHOCYTES # BLD AUTO: 0.9 10E3/UL (ref 0.8–5.3)
LYMPHOCYTES NFR BLD AUTO: 9 %
MCH RBC QN AUTO: 25 PG (ref 26.5–33)
MCHC RBC AUTO-ENTMCNC: 31.2 G/DL (ref 31.5–36.5)
MCV RBC AUTO: 80 FL (ref 78–100)
MONOCYTES # BLD AUTO: 0.8 10E3/UL (ref 0–1.3)
MONOCYTES NFR BLD AUTO: 8 %
NEUTROPHILS # BLD AUTO: 8.7 10E3/UL (ref 1.6–8.3)
NEUTROPHILS NFR BLD AUTO: 82 %
NRBC # BLD AUTO: 0 10E3/UL
NRBC BLD AUTO-RTO: 0 /100
PLATELET # BLD AUTO: 416 10E3/UL (ref 150–450)
RBC # BLD AUTO: 4.84 10E6/UL (ref 4.4–5.9)
WBC # BLD AUTO: 10.7 10E3/UL (ref 4–11)

## 2025-03-11 PROCEDURE — 80069 RENAL FUNCTION PANEL: CPT | Mod: ORL | Performed by: NURSE PRACTITIONER

## 2025-03-11 PROCEDURE — 83880 ASSAY OF NATRIURETIC PEPTIDE: CPT | Mod: ORL | Performed by: NURSE PRACTITIONER

## 2025-03-11 PROCEDURE — P9604 ONE-WAY ALLOW PRORATED TRIP: HCPCS | Mod: ORL | Performed by: NURSE PRACTITIONER

## 2025-03-11 PROCEDURE — 84550 ASSAY OF BLOOD/URIC ACID: CPT | Mod: ORL | Performed by: NURSE PRACTITIONER

## 2025-03-11 PROCEDURE — 36415 COLL VENOUS BLD VENIPUNCTURE: CPT | Mod: ORL | Performed by: NURSE PRACTITIONER

## 2025-03-11 PROCEDURE — 85025 COMPLETE CBC W/AUTO DIFF WBC: CPT | Mod: ORL | Performed by: NURSE PRACTITIONER

## 2025-03-12 LAB
GLUCOSE SERPL-MCNC: 255 MG/DL (ref 70–99)
NT-PROBNP SERPL-MCNC: 1494 PG/ML (ref 0–1800)
URATE SERPL-MCNC: 6.9 MG/DL (ref 3.4–7)

## 2025-03-13 LAB
ALBUMIN SERPL BCG-MCNC: 3.4 G/DL (ref 3.5–5.2)
ANION GAP SERPL CALCULATED.3IONS-SCNC: 12 MMOL/L (ref 7–15)
BUN SERPL-MCNC: 46.4 MG/DL (ref 8–23)
CALCIUM SERPL-MCNC: ABNORMAL MG/DL
CHLORIDE SERPL-SCNC: 96 MMOL/L (ref 98–107)
CREAT SERPL-MCNC: 1.04 MG/DL (ref 0.67–1.17)
EGFRCR SERPLBLD CKD-EPI 2021: 70 ML/MIN/1.73M2
HCO3 SERPL-SCNC: 30 MMOL/L (ref 22–29)
Lab: NORMAL
PERFORMING LABORATORY: NORMAL
PHOSPHATE SERPL-MCNC: 2.8 MG/DL (ref 2.5–4.5)
POTASSIUM SERPL-SCNC: 3.7 MMOL/L (ref 3.4–5.3)
SODIUM SERPL-SCNC: 138 MMOL/L (ref 135–145)
SPECIMEN STATUS: NORMAL
TEST NAME: NORMAL

## 2025-03-15 LAB — MISCELLANEOUS TEST 1 (ARUP): NORMAL

## 2025-04-17 ENCOUNTER — LAB REQUISITION (OUTPATIENT)
Dept: LAB | Facility: CLINIC | Age: 85
End: 2025-04-17
Payer: COMMERCIAL

## 2025-04-17 DIAGNOSIS — R60.0 LOCALIZED EDEMA: ICD-10-CM

## 2025-04-22 PROCEDURE — 36415 COLL VENOUS BLD VENIPUNCTURE: CPT | Mod: ORL | Performed by: NURSE PRACTITIONER

## 2025-04-22 PROCEDURE — P9604 ONE-WAY ALLOW PRORATED TRIP: HCPCS | Mod: ORL | Performed by: NURSE PRACTITIONER

## 2025-04-22 PROCEDURE — 80069 RENAL FUNCTION PANEL: CPT | Mod: ORL | Performed by: NURSE PRACTITIONER

## 2025-04-23 LAB
ALBUMIN SERPL BCG-MCNC: 4.1 G/DL (ref 3.5–5.2)
ANION GAP SERPL CALCULATED.3IONS-SCNC: 10 MMOL/L (ref 7–15)
BUN SERPL-MCNC: 22.9 MG/DL (ref 8–23)
CALCIUM SERPL-MCNC: 9.1 MG/DL (ref 8.8–10.4)
CHLORIDE SERPL-SCNC: 100 MMOL/L (ref 98–107)
CREAT SERPL-MCNC: 1.22 MG/DL (ref 0.67–1.17)
EGFRCR SERPLBLD CKD-EPI 2021: 58 ML/MIN/1.73M2
GLUCOSE SERPL-MCNC: 115 MG/DL (ref 70–99)
HCO3 SERPL-SCNC: 28 MMOL/L (ref 22–29)
PHOSPHATE SERPL-MCNC: 3.3 MG/DL (ref 2.5–4.5)
POTASSIUM SERPL-SCNC: 4.2 MMOL/L (ref 3.4–5.3)
SODIUM SERPL-SCNC: 138 MMOL/L (ref 135–145)